# Patient Record
Sex: MALE | Race: WHITE | NOT HISPANIC OR LATINO | Employment: OTHER | URBAN - METROPOLITAN AREA
[De-identification: names, ages, dates, MRNs, and addresses within clinical notes are randomized per-mention and may not be internally consistent; named-entity substitution may affect disease eponyms.]

---

## 2017-04-28 ENCOUNTER — OFFICE VISIT (OUTPATIENT)
Dept: INTERNAL MEDICINE | Facility: CLINIC | Age: 62
End: 2017-04-28
Payer: COMMERCIAL

## 2017-04-28 VITALS
OXYGEN SATURATION: 98 % | BODY MASS INDEX: 28.28 KG/M2 | WEIGHT: 202 LBS | DIASTOLIC BLOOD PRESSURE: 74 MMHG | RESPIRATION RATE: 16 BRPM | TEMPERATURE: 97.7 F | HEIGHT: 71 IN | SYSTOLIC BLOOD PRESSURE: 126 MMHG | HEART RATE: 94 BPM

## 2017-04-28 DIAGNOSIS — Z00.00 ENCOUNTER FOR ROUTINE ADULT HEALTH EXAMINATION WITHOUT ABNORMAL FINDINGS: Primary | ICD-10-CM

## 2017-04-28 DIAGNOSIS — E78.5 HYPERLIPIDEMIA LDL GOAL <130: ICD-10-CM

## 2017-04-28 DIAGNOSIS — N52.9 ERECTILE DYSFUNCTION, UNSPECIFIED ERECTILE DYSFUNCTION TYPE: ICD-10-CM

## 2017-04-28 DIAGNOSIS — C61 MALIGNANT NEOPLASM OF PROSTATE (H): ICD-10-CM

## 2017-04-28 PROCEDURE — 99396 PREV VISIT EST AGE 40-64: CPT | Performed by: INTERNAL MEDICINE

## 2017-04-28 RX ORDER — ATORVASTATIN CALCIUM 20 MG/1
20 TABLET, FILM COATED ORAL DAILY
Qty: 90 TABLET | Refills: 3 | Status: SHIPPED | OUTPATIENT
Start: 2017-04-28 | End: 2018-03-21

## 2017-04-28 RX ORDER — TADALAFIL 20 MG/1
10-20 TABLET ORAL DAILY PRN
Qty: 6 TABLET | Refills: 3 | Status: SHIPPED | OUTPATIENT
Start: 2017-04-28 | End: 2019-09-17

## 2017-04-28 ASSESSMENT — PAIN SCALES - GENERAL: PAINLEVEL: NO PAIN (0)

## 2017-04-28 NOTE — PROGRESS NOTES
SUBJECTIVE:     CC: Jaswinder Thompson Sr. is an 62 year old male who presents for preventative health visit.     Healthy Habits:    Do you get at least three servings of calcium containing foods daily (dairy, green leafy vegetables, etc.)? yes    Amount of exercise or daily activities, outside of work: 3 days per week    Problems taking medications regularly No    Medication side effects: No    Have you had an eye exam in the past two years? yes    Do you see a dentist twice per year? yes    Do you have sleep apnea, excessive snoring or daytime drowsiness?no    Doing pretty well, no real complaints, needs the yearly check and refills.    Taking medications atorvastatin.   History of prostate cancer and prostatectomy.  Has some ED but on cialis.         Today's PHQ-2 Score:   PHQ-2 ( 1999 Pfizer) 4/28/2017 4/1/2016   Q1: Little interest or pleasure in doing things 0 0   Q2: Feeling down, depressed or hopeless 0 0   PHQ-2 Score 0 0       Abuse: Current or Past(Physical, Sexual or Emotional)- No  Do you feel safe in your environment - Yes    Social History   Substance Use Topics     Smoking status: Former Smoker     Quit date: 7/1/2005     Smokeless tobacco: Never Used     Alcohol use 0.0 oz/week     0 Standard drinks or equivalent per week      Comment: < one twelve pack beer per week     The patient does not drink >3 drinks per day nor >7 drinks per week.    Last PSA:   PSA   Date Value Ref Range Status   09/19/2016  0 - 4 ug/L Final    <0.01  Assay Method:  Chemiluminescence using Siemens Vista analyzer         Recent Labs   Lab Test  04/01/16   1523  03/30/15   1526  04/05/14   0824   CHOL  189  187  171   HDL  57  54  50   LDL  106*  92  95   TRIG  130  206*  126   CHOLHDLRATIO   --   3.5  3.0   NHDL  132*   --    --        Reviewed orders with patient. Reviewed health maintenance and updated orders accordingly - Yes    Reviewed and updated as needed this visit by clinical staff  Tobacco  Allergies  Meds      "    Reviewed and updated as needed this visit by Provider            ROS:  C: NEGATIVE for fever, chills, change in weight  I: NEGATIVE for worrisome rashes, moles or lesions  E: NEGATIVE for vision changes or irritation  ENT: NEGATIVE for ear, mouth and throat problems  R: NEGATIVE for significant cough or SOB  CV: NEGATIVE for chest pain, palpitations or peripheral edema  GI: NEGATIVE for nausea, abdominal pain, heartburn, or change in bowel habits   male: erectile dysfunction  M: NEGATIVE for significant arthralgias or myalgia  N: NEGATIVE for weakness, dizziness or paresthesias  P: NEGATIVE for changes in mood or affect    Problem list, Medication list, Allergies, and Medical/Social/Surgical histories reviewed in EPIC and updated as appropriate.  OBJECTIVE:     /74 (BP Location: Left arm, Patient Position: Chair, Cuff Size: Adult Large)  Pulse 94  Temp 97.7  F (36.5  C) (Temporal)  Resp 16  Ht 5' 11\" (1.803 m)  Wt 202 lb (91.6 kg)  SpO2 98%  BMI 28.17 kg/m2  EXAM:  GENERAL: healthy, alert and no distress  EYES: Eyes grossly normal to inspection, PERRL and conjunctivae and sclerae normal  HENT: ear canals and TM's normal, nose and mouth without ulcers or lesions  NECK: no adenopathy, no asymmetry, masses, or scars and thyroid normal to palpation  RESP: lungs clear to auscultation - no rales, rhonchi or wheezes  CV: regular rate and rhythm, normal S1 S2, no S3 or S4, no murmur, click or rub, no peripheral edema and peripheral pulses strong  ABDOMEN: soft, nontender, no hepatosplenomegaly, no masses and bowel sounds normal  MS: no gross musculoskeletal defects noted, no edema  SKIN: no suspicious lesions or rashes  NEURO: Normal strength and tone, mentation intact and speech normal  PSYCH: mentation appears normal, affect normal/bright    ASSESSMENT/PLAN:         ICD-10-CM    1. Encounter for routine adult health examination without abnormal findings Z00.00    2. Hyperlipidemia LDL goal <130 E78.5 " "Lipid Profile     Comprehensive metabolic panel     atorvastatin (LIPITOR) 20 MG tablet   3. Malignant neoplasm of prostate (H) C61 PSA, tumor marker   4. Erectile dysfunction, unspecified erectile dysfunction type N52.9 tadalafil (CIALIS) 20 MG tablet     Overall doing well, will check fasting lipids, liver, kidney, glucose and his psa tomorrow. Refills are done.      COUNSELING:  Reviewed preventive health counseling, as reflected in patient instructions       Regular exercise       Healthy diet/nutrition         reports that he quit smoking about 11 years ago. He has never used smokeless tobacco.    Estimated body mass index is 28.17 kg/(m^2) as calculated from the following:    Height as of this encounter: 5' 11\" (1.803 m).    Weight as of this encounter: 202 lb (91.6 kg).       Counseling Resources:  ATP IV Guidelines  Pooled Cohorts Equation Calculator  FRAX Risk Assessment  ICSI Preventive Guidelines  Dietary Guidelines for Americans, 2010  USDA's MyPlate  ASA Prophylaxis  Lung CA Screening    Javon oDmínguez MD  [facility]  "

## 2017-04-28 NOTE — NURSING NOTE
"Chief Complaint   Patient presents with     Physical       Initial /74 (BP Location: Left arm, Patient Position: Chair, Cuff Size: Adult Large)  Pulse 94  Temp 97.7  F (36.5  C) (Temporal)  Resp 16  Ht 5' 11\" (1.803 m)  Wt 202 lb (91.6 kg)  SpO2 98%  BMI 28.17 kg/m2 Estimated body mass index is 28.17 kg/(m^2) as calculated from the following:    Height as of this encounter: 5' 11\" (1.803 m).    Weight as of this encounter: 202 lb (91.6 kg).  Medication Reconciliation: complete    "

## 2017-04-28 NOTE — MR AVS SNAPSHOT
After Visit Summary   4/28/2017    Jaswinder Thompson Sr.    MRN: [id number]           Patient Information     Date Of Birth          1955        Visit Information        Provider Department      4/28/2017 4:00 PM Javon Domínguez MD [facility] Instructions      Preventive Health Recommendations  Male Ages 50 - 64    Yearly exam:             See your health care provider every year in order to  o   Review health changes.   o   Discuss preventive care.    o   Review your medicines if your doctor has prescribed any.     Have a cholesterol test every 5 years, or more frequently if you are at risk for high cholesterol/heart disease.     Have a diabetes test (fasting glucose) every three years. If you are at risk for diabetes, you should have this test more often.     Have a colonoscopy at age 50, or have a yearly FIT test (stool test). These exams will check for colon cancer.      Talk with your health care provider about whether or not a prostate cancer screening test (PSA) is right for you.    You should be tested each year for STDs (sexually transmitted diseases), if you re at risk.     Shots: Get a flu shot each year. Get a tetanus shot every 10 years.     Nutrition:    Eat at least 5 servings of fruits and vegetables daily.     Eat whole-grain bread, whole-wheat pasta and brown rice instead of white grains and rice.     Talk to your provider about Calcium and Vitamin D.     Lifestyle    Exercise for at least 150 minutes a week (30 minutes a day, 5 days a week). This will help you control your weight and prevent disease.     Limit alcohol to one drink per day.     No smoking.     Wear sunscreen to prevent skin cancer.     See your dentist every six months for an exam and cleaning.     See your eye doctor every 1 to 2 years.          Follow-ups after your visit        Who to contact     If you have questions or need follow up information about today's clinic visit or your  "schedule please contact [facility] directly at [phone].  Normal or non-critical lab and imaging results will be communicated to you by MyChart, letter or phone within 4 business days after the clinic has received the results. If you do not hear from us within 7 days, please contact the clinic through [employer] or phone. If you have a critical or abnormal lab result, we will notify you by phone as soon as possible.  Submit refill requests through [employer] or call your pharmacy and they will forward the refill request to us. Please allow 3 business days for your refill to be completed.          Additional Information About Your Visit        [employer] Information     [employer] gives you secure access to your electronic health record. If you see a primary care provider, you can also send messages to your care team and make appointments. If you have questions, please call your primary care clinic.  If you do not have a primary care provider, please call [phone] and they will assist you.        Care EveryWhere ID     This is your Care EveryWhere ID. This could be used by other organizations to access your [facility] medical records  [id number]        Your Vitals Were     Pulse Temperature Respirations Height Pulse Oximetry BMI (Body Mass Index)    94 97.7  F (36.5  C) (Temporal) 16 5' 11\" (1.803 m) 98% 28.17 kg/m2       Blood Pressure from Last 3 Encounters:   04/28/17 126/74   05/02/16 128/77   04/29/16 124/72    Weight from Last 3 Encounters:   04/28/17 202 lb (91.6 kg)   05/02/16 201 lb (91.2 kg)   04/29/16 201 lb (91.2 kg)              Today, you had the following     No orders found for display       Primary Care Provider    None Specified       No primary provider on file.        Thank you!     Thank you for choosing [facility]  for your care. Our goal is always to provide you with excellent care. Hearing back from our patients is one way we can continue to improve our services. " Please take a few minutes to complete the written survey that you may receive in the mail after your visit with us. Thank you!             Your Updated Medication List - Protect others around you: Learn how to safely use, store and throw away your medicines at www.disposemymeds.org.          This list is accurate as of: 4/28/17  4:06 PM.  Always use your most recent med list.                   Brand Name Dispense Instructions for use    aspirin 81 MG tablet     100    ONE DAILY       atorvastatin 20 MG tablet    LIPITOR    90 tablet    Take 1 tablet (20 mg) by mouth daily       CoQ10 100 MG Caps      2 cap daily       Fish Oil 500 MG Caps      1 capsule daily       Glucosamine HCl 1500 MG Tabs      1 tablet twice aday       Multiple vitamin Caps      1 capsule daily       tadalafil 20 MG tablet    CIALIS    6 tablet    Take 0.5-1 tablets (10-20 mg) by mouth daily as needed for erectile dysfunction Never use with nitroglycerin, terazosin or doxazosin.

## 2017-04-29 DIAGNOSIS — E78.5 HYPERLIPIDEMIA LDL GOAL <130: ICD-10-CM

## 2017-04-29 DIAGNOSIS — C61 MALIGNANT NEOPLASM OF PROSTATE (H): ICD-10-CM

## 2017-04-29 LAB
ALBUMIN SERPL-MCNC: 4.3 G/DL (ref 3.4–5)
ALP SERPL-CCNC: 70 U/L (ref 40–150)
ALT SERPL W P-5'-P-CCNC: 43 U/L (ref 0–70)
ANION GAP SERPL CALCULATED.3IONS-SCNC: 9 MMOL/L (ref 3–14)
AST SERPL W P-5'-P-CCNC: 19 U/L (ref 0–45)
BILIRUB SERPL-MCNC: 0.7 MG/DL (ref 0.2–1.3)
BUN SERPL-MCNC: 15 MG/DL (ref 7–30)
CALCIUM SERPL-MCNC: 11 MG/DL (ref 8.5–10.1)
CHLORIDE SERPL-SCNC: 103 MMOL/L (ref 94–109)
CHOLEST SERPL-MCNC: 196 MG/DL
CO2 SERPL-SCNC: 30 MMOL/L (ref 20–32)
CREAT SERPL-MCNC: 1.03 MG/DL (ref 0.66–1.25)
GFR SERPL CREATININE-BSD FRML MDRD: 73 ML/MIN/1.7M2
GLUCOSE SERPL-MCNC: 86 MG/DL (ref 70–99)
HDLC SERPL-MCNC: 56 MG/DL
LDLC SERPL CALC-MCNC: 108 MG/DL
NONHDLC SERPL-MCNC: 140 MG/DL
POTASSIUM SERPL-SCNC: 4.1 MMOL/L (ref 3.4–5.3)
PROT SERPL-MCNC: 7.2 G/DL (ref 6.8–8.8)
PSA SERPL-MCNC: NORMAL UG/L (ref 0–4)
SODIUM SERPL-SCNC: 142 MMOL/L (ref 133–144)
TRIGL SERPL-MCNC: 162 MG/DL

## 2017-04-29 PROCEDURE — 84153 ASSAY OF PSA TOTAL: CPT | Performed by: INTERNAL MEDICINE

## 2017-04-29 PROCEDURE — 36415 COLL VENOUS BLD VENIPUNCTURE: CPT | Performed by: INTERNAL MEDICINE

## 2017-04-29 PROCEDURE — 80061 LIPID PANEL: CPT | Performed by: INTERNAL MEDICINE

## 2017-04-29 PROCEDURE — 80053 COMPREHEN METABOLIC PANEL: CPT | Performed by: INTERNAL MEDICINE

## 2017-05-01 ENCOUNTER — TELEPHONE (OUTPATIENT)
Dept: INTERNAL MEDICINE | Facility: CLINIC | Age: 62
End: 2017-05-01

## 2017-05-01 DIAGNOSIS — E83.52 SERUM CALCIUM ELEVATED: Primary | ICD-10-CM

## 2017-05-01 NOTE — TELEPHONE ENCOUNTER
----- Message from Javon Domínguez MD sent at 4/30/2017  7:02 PM CDT -----  His labs are good but calcium is high at 11, repeat a calcium in a week or so to double check that.

## 2017-05-06 DIAGNOSIS — E83.52 SERUM CALCIUM ELEVATED: ICD-10-CM

## 2017-05-06 LAB — CALCIUM SERPL-MCNC: 8.9 MG/DL (ref 8.5–10.1)

## 2017-05-06 PROCEDURE — 82310 ASSAY OF CALCIUM: CPT | Performed by: INTERNAL MEDICINE

## 2017-05-06 PROCEDURE — 36415 COLL VENOUS BLD VENIPUNCTURE: CPT | Performed by: INTERNAL MEDICINE

## 2017-06-04 DIAGNOSIS — E78.5 HYPERLIPIDEMIA LDL GOAL <130: ICD-10-CM

## 2017-06-07 RX ORDER — ATORVASTATIN CALCIUM 20 MG/1
TABLET, FILM COATED ORAL
Qty: 90 TABLET | Refills: 0 | OUTPATIENT
Start: 2017-06-07

## 2018-02-12 ENCOUNTER — TELEPHONE (OUTPATIENT)
Dept: INTERNAL MEDICINE | Facility: CLINIC | Age: 63
End: 2018-02-12

## 2018-02-12 ENCOUNTER — OFFICE VISIT (OUTPATIENT)
Dept: INTERNAL MEDICINE | Facility: CLINIC | Age: 63
End: 2018-02-12
Payer: COMMERCIAL

## 2018-02-12 VITALS
DIASTOLIC BLOOD PRESSURE: 78 MMHG | WEIGHT: 213 LBS | SYSTOLIC BLOOD PRESSURE: 136 MMHG | HEART RATE: 84 BPM | RESPIRATION RATE: 16 BRPM | BODY MASS INDEX: 28.85 KG/M2 | HEIGHT: 72 IN | TEMPERATURE: 97.2 F | OXYGEN SATURATION: 98 %

## 2018-02-12 DIAGNOSIS — E78.5 HYPERLIPIDEMIA LDL GOAL <130: ICD-10-CM

## 2018-02-12 DIAGNOSIS — C61 MALIGNANT NEOPLASM OF PROSTATE (H): ICD-10-CM

## 2018-02-12 DIAGNOSIS — R06.83 SNORING: ICD-10-CM

## 2018-02-12 DIAGNOSIS — Z87.891 PERSONAL HISTORY OF TOBACCO USE, PRESENTING HAZARDS TO HEALTH: ICD-10-CM

## 2018-02-12 DIAGNOSIS — Z00.00 ENCOUNTER FOR ROUTINE ADULT HEALTH EXAMINATION WITHOUT ABNORMAL FINDINGS: Primary | ICD-10-CM

## 2018-02-12 DIAGNOSIS — Z13.6 SCREENING FOR ABDOMINAL AORTIC ANEURYSM: ICD-10-CM

## 2018-02-12 LAB
ALBUMIN SERPL-MCNC: 4.1 G/DL (ref 3.4–5)
ALP SERPL-CCNC: 98 U/L (ref 40–150)
ALT SERPL W P-5'-P-CCNC: 113 U/L (ref 0–70)
ANION GAP SERPL CALCULATED.3IONS-SCNC: 9 MMOL/L (ref 3–14)
AST SERPL W P-5'-P-CCNC: 49 U/L (ref 0–45)
BILIRUB SERPL-MCNC: 0.5 MG/DL (ref 0.2–1.3)
BUN SERPL-MCNC: 17 MG/DL (ref 7–30)
CALCIUM SERPL-MCNC: 8.7 MG/DL (ref 8.5–10.1)
CHLORIDE SERPL-SCNC: 105 MMOL/L (ref 94–109)
CHOLEST SERPL-MCNC: 198 MG/DL
CO2 SERPL-SCNC: 28 MMOL/L (ref 20–32)
CREAT SERPL-MCNC: 1 MG/DL (ref 0.66–1.25)
GFR SERPL CREATININE-BSD FRML MDRD: 75 ML/MIN/1.7M2
GLUCOSE SERPL-MCNC: 92 MG/DL (ref 70–99)
HDLC SERPL-MCNC: 54 MG/DL
LDLC SERPL CALC-MCNC: 79 MG/DL
NONHDLC SERPL-MCNC: 144 MG/DL
POTASSIUM SERPL-SCNC: 4.4 MMOL/L (ref 3.4–5.3)
PROT SERPL-MCNC: 7.1 G/DL (ref 6.8–8.8)
PSA SERPL-MCNC: <0.01 UG/L (ref 0–4)
SODIUM SERPL-SCNC: 142 MMOL/L (ref 133–144)
TRIGL SERPL-MCNC: 324 MG/DL

## 2018-02-12 PROCEDURE — 93000 ELECTROCARDIOGRAM COMPLETE: CPT | Performed by: INTERNAL MEDICINE

## 2018-02-12 PROCEDURE — 36415 COLL VENOUS BLD VENIPUNCTURE: CPT | Performed by: INTERNAL MEDICINE

## 2018-02-12 PROCEDURE — G0009 ADMIN PNEUMOCOCCAL VACCINE: HCPCS | Performed by: INTERNAL MEDICINE

## 2018-02-12 PROCEDURE — 84153 ASSAY OF PSA TOTAL: CPT | Performed by: INTERNAL MEDICINE

## 2018-02-12 PROCEDURE — 80053 COMPREHEN METABOLIC PANEL: CPT | Performed by: INTERNAL MEDICINE

## 2018-02-12 PROCEDURE — 80061 LIPID PANEL: CPT | Performed by: INTERNAL MEDICINE

## 2018-02-12 PROCEDURE — 90670 PCV13 VACCINE IM: CPT | Performed by: INTERNAL MEDICINE

## 2018-02-12 PROCEDURE — G0402 INITIAL PREVENTIVE EXAM: HCPCS | Performed by: INTERNAL MEDICINE

## 2018-02-12 ASSESSMENT — PAIN SCALES - GENERAL: PAINLEVEL: NO PAIN (0)

## 2018-02-12 ASSESSMENT — ACTIVITIES OF DAILY LIVING (ADL)
CURRENT_FUNCTION: NO ASSISTANCE NEEDED
I_NEED_ASSISTANCE_FOR_THE_FOLLOWING_DAILY_ACTIVITIES:: NO ASSISTANCE IS NEEDED

## 2018-02-12 NOTE — PROGRESS NOTES
SUBJECTIVE:   Jaswinder Thompson Sr. is a 63 year old male who presents for Preventive Visit.    Feeling good, was in [place].  Sleep issues for years, snores but breath rite helps him quite a bit. Falls asleep easily but wakes up after 2-3 hours.  Then lays there for a few hours.  Fine with sleeping pill.  Will be joining a gym    Are you in the first 12 months of your Medicare coverage?  Yes,  Visual Acuity:  Right Eye: 20/125   Left Eye: 20/50  Both Eyes: 20/40    Physical   Annual:     Getting at least 3 servings of Calcium per day::  Yes    Bi-annual eye exam::  Yes    Dental care twice a year::  NO    Sleep apnea or symptoms of sleep apnea::  Daytime drowsiness and Sleep apnea    Diet::  Regular (no restrictions)    Taking medications regularly::  Yes    Medication side effects::  None    Additional concerns today::  No    Ability to successfully perform activities of daily living: no assistance needed  Home Safety:  Throw rugs in the hallway  Hearing Impairment: difficulty following a conversation in a noisy restaurant or crowded room, feel that people are mumbling or not speaking clearly, need to ask people to speak up or repeat themselves and difficulty understanding soft or whispered speech  tested at work.  Has some loss    Ability to successfully perform activities of daily living: Yes, no assistance needed  Home safety:  none identified   Hearing impairment: Yes, a little    Fall risk:  Fallen 2 or more times in the past year?: No  Any fall with injury in the past year?: No    COGNITIVE SCREEN  1) Repeat 3 items (Banana, Sunrise, Chair)    2) Clock draw: NORMAL  3) 3 item recall: Recalls 2 objects   Results: NORMAL clock, 1-2 items recalled: COGNITIVE IMPAIRMENT LESS LIKELY    Mini-CogTM Copyright S Nyla. Licensed by the author for use in [facility]; reprinted with permission (amber@.[facility]). All rights reserved.        Reviewed and updated as needed this visit by clinical staff  Allergies   Meds         Reviewed and updated as needed this visit by Provider        Social History   Substance Use Topics     Smoking status: Former Smoker     Quit date: 7/1/2005     Smokeless tobacco: Never Used     Alcohol use 0.0 oz/week     0 Standard drinks or equivalent per week      Comment: < one twelve pack beer per week       Alcohol Use 2/12/2018   If you drink alcohol, do you typically have greater than 3 drinks per day OR greater than 7 drinks per week?   No         Today's PHQ-2 Score:   PHQ-2 ( 1999 Pfizer) 2/12/2018   Q1: Little interest or pleasure in doing things 0   Q2: Feeling down, depressed or hopeless 0   PHQ-2 Score 0   Q1: Little interest or pleasure in doing things Not at all   Q2: Feeling down, depressed or hopeless Not at all   PHQ-2 Score 0       Do you feel safe in your environment - Yes    Do you have a Health Care Directive?: No: Advance care planning was reviewed with patient; patient declined at this time.    Current providers sharing in care for this patient include:   Patient Care Team:  Javon Domínguez MD as PCP - General (Internal Medicine)    The following health maintenance items are reviewed in Epic and correct as of today:  Health Maintenance   Topic Date Due     HEPATITIS C SCREENING  02/01/1971     ADVANCE DIRECTIVE PLANNING Q5 YRS  03/21/2017     INFLUENZA VACCINE (SYSTEM ASSIGNED)  09/01/2017     PNEUMOCOCCAL (1 of 2 - PCV13) 02/01/2018     AORTIC ANEURYSM SCREENING (SYSTEM ASSIGNED)  02/01/2018     FALL RISK ASSESSMENT  02/01/2018     LIPID MONITORING Q1 YEAR  04/29/2018     TETANUS IMMUNIZATION (SYSTEM ASSIGNED)  03/06/2019     COLONOSCOPY Q5 YR  05/02/2021       Pneumonia Vaccine:Adults age 65+ who have not received previous Pneumovax (PPSV23) or PCV13 as an adult: Should first be given PCV13 AND then should be given PPSV23 6-12 months after PCV13    Review of Systems  C: NEGATIVE for fever, chills, change in weight  I: NEGATIVE for worrisome rashes, moles or lesions  E:  "NEGATIVE for vision changes or irritation  E/M: NEGATIVE for ear, mouth and throat problems  R: NEGATIVE for significant cough or SOB  B: NEGATIVE for masses, tenderness or discharge  CV: NEGATIVE for chest pain, palpitations or peripheral edema  GI: NEGATIVE for nausea, abdominal pain, heartburn, or change in bowel habits  : NEGATIVE for frequency, dysuria, or hematuria  M: NEGATIVE for significant arthralgias or myalgia  N: NEGATIVE for weakness, dizziness or paresthesias  E: NEGATIVE for temperature intolerance, skin/hair changes  H: NEGATIVE for bleeding problems  P: NEGATIVE for changes in mood or affect    OBJECTIVE:   /78  Pulse 84  Temp 97.2  F (36.2  C) (Temporal)  Resp 16  Ht 5' 11.75\" (1.822 m)  Wt 213 lb (96.6 kg)  SpO2 98%  BMI 29.09 kg/m2 Estimated body mass index is 28.17 kg/(m^2) as calculated from the following:    Height as of 4/28/17: 5' 11\" (1.803 m).    Weight as of 4/28/17: 202 lb (91.6 kg).  Physical Exam  GENERAL: healthy, alert and no distress  EYES: Eyes grossly normal to inspection, PERRL and conjunctivae and sclerae normal  HENT: ear canals and TM's normal, nose and mouth without ulcers or lesions  NECK: no adenopathy, no asymmetry, masses, or scars and thyroid normal to palpation  NECK: chronic right side lipoma  RESP: lungs clear to auscultation - no rales, rhonchi or wheezes  CV: regular rate and rhythm, normal S1 S2, no S3 or S4, no murmur, click or rub, no peripheral edema and peripheral pulses strong  ABDOMEN: soft, nontender, no hepatosplenomegaly, no masses and bowel sounds normal  MS: no gross musculoskeletal defects noted, no edema  SKIN: no suspicious lesions or rashes  NEURO: Normal strength and tone, mentation intact and speech normal  PSYCH: mentation appears normal, affect normal/bright    EKG-NSR no st changes.      ASSESSMENT / PLAN:       ICD-10-CM    1. Encounter for routine adult health examination without abnormal findings Z00.00 Lipid Profile     " "Comprehensive metabolic panel     EKG 12-lead complete w/read - Clinics     Pneumococcal vaccine 13 valent PCV13 IM (Prevnar) [44939]     ADMIN: Vaccine, Initial (89249)   2. Hyperlipidemia LDL goal <130 E78.5 Lipid Profile     Comprehensive metabolic panel   3. Malignant neoplasm of prostate (H) C61 PSA, tumor marker   4. Screening for abdominal aortic aneurysm Z13.6  Aorta Medicare AAA Screening     CANCELED:  Abdomen Limited     CANCELED:  Aorta Medicare AAA Screening   5. Personal history of tobacco use, presenting hazards to health Z87.891  Aorta Medicare AAA Screening     CANCELED:  Abdomen Limited     CANCELED: [place] Aorta Medicare AAA Screening   6. Snoring R06.83 SLEEP EVALUATION & MANAGEMENT REFERRAL - [facility] [phone] (Age 11 if over 100 lbs)     Needs psa with past prostate cancer  Lipids are treated, check labs.  For history of smoking will get a AAA screen  EKG for medicare wellness first exam    For his sleep issues and insomnia, daytime sleepiness and snoring will refer to sleep, may have sleep apnea and needs a sleep study.    End of Life Planning:  Patient currently has an advanced directive: No.  I have verified the patient's ablity to prepare an advanced directive/make health care decisions.  Literature was provided to assist patient in preparing an advanced directive.    COUNSELING:  Reviewed preventive health counseling, as reflected in patient instructions       Consider AAA screening for ages 65-75 and smoking history       Regular exercise       Healthy diet/nutrition       Immunizations    Vaccinated for: Pneumococcal              Estimated body mass index is 28.17 kg/(m^2) as calculated from the following:    Height as of 4/28/17: 5' 11\" (1.803 m).    Weight as of 4/28/17: 202 lb (91.6 kg).     reports that he quit smoking about 12 years ago. He has never used smokeless tobacco.      Appropriate preventive services were discussed with this " patient, including applicable screening as appropriate for cardiovascular disease, diabetes, osteopenia/osteoporosis, and glaucoma.  As appropriate for age/gender, discussed screening for colorectal cancer, prostate cancer, breast cancer, and cervical cancer. Checklist reviewing preventive services available has been given to the patient.    Reviewed patients plan of care and provided an AVS. The Basic Care Plan (routine screening as documented in Health Maintenance) for Jaswinder meets the Care Plan requirement. This Care Plan has been established and reviewed with the Patient.    Counseling Resources:  ATP IV Guidelines  Pooled Cohorts Equation Calculator  Breast Cancer Risk Calculator  FRAX Risk Assessment  ICSI Preventive Guidelines  Dietary Guidelines for Americans, 2010  USDA's MyPlate  ASA Prophylaxis  Lung CA Screening    Javon Domínguez MD  [facility]

## 2018-02-12 NOTE — TELEPHONE ENCOUNTER
----- Message from Javon Domínguez MD sent at 2/12/2018  1:15 PM CST -----  His psa is ok, none seen. His triglycerides and liver tests are up a little, can be from fatty high carb foods.  Watch his diet and then recheck fasting labs for lipids and lfts in 2 months

## 2018-02-12 NOTE — PROGRESS NOTES
Screening Questionnaire for Adult Immunization    Are you sick today?   No   Do you have allergies to medications, food, a vaccine component or latex?   No   Have you ever had a serious reaction after receiving a vaccination?   No   Do you have a long-term health problem with heart disease, lung disease, asthma, kidney disease, metabolic disease (e.g. diabetes), anemia, or other blood disorder?   No   Do you have cancer, leukemia, HIV/AIDS, or any other immune system problem?   No   In the past 3 months, have you taken medications that affect  your immune system, such as prednisone, other steroids, or anticancer drugs; drugs for the treatment of rheumatoid arthritis, Crohn s disease, or psoriasis; or have you had radiation treatments?   No   Have you had a seizure, or a brain or other nervous system problem?   No   During the past year, have you received a transfusion of blood or blood     products, or been given immune (gamma) globulin or antiviral drug?   No   For women: Are you pregnant or is there a chance you could become        pregnant during the next month?   No   Have you received any vaccinations in the past 4 weeks?   No     Immunization questionnaire answers were all negative.        Per orders of Dr. Javon Domínguez, injection of Prevnar given by Shyann Ness. Patient instructed to remain in clinic for 15 minutes afterwards, and to report any adverse reaction to me immediately.       Screening performed by Shyann Ness on 2/12/2018 at 9:36 AM.

## 2018-02-12 NOTE — TELEPHONE ENCOUNTER
Patient called back and info was given.  Thank you,  Tamra Ding   for [facility]

## 2018-02-12 NOTE — NURSING NOTE
"Chief Complaint   Patient presents with     Medicare Visit     welcome to medicare       Initial /78  Pulse 84  Temp 97.2  F (36.2  C) (Temporal)  Resp 16  Ht 5' 11.75\" (1.822 m)  Wt 213 lb (96.6 kg)  SpO2 98%  BMI 29.09 kg/m2 Estimated body mass index is 29.09 kg/(m^2) as calculated from the following:    Height as of this encounter: 5' 11.75\" (1.822 m).    Weight as of this encounter: 213 lb (96.6 kg).  Medication Reconciliation: complete    "

## 2018-02-12 NOTE — MR AVS SNAPSHOT
After Visit Summary   2/12/2018    Jaswinder Thompson Sr.    MRN: [id number]           Patient Information     Date Of Birth          1955        Visit Information        Provider Department      2/12/2018 8:45 AM Javon Domínguez MD [facility]        Today's Diagnoses     Encounter for routine adult health examination without abnormal findings    -  1    Hyperlipidemia LDL goal <130        Malignant neoplasm of prostate (H)        Screening for abdominal aortic aneurysm        Personal history of tobacco use, presenting hazards to health        Snoring           Follow-ups after your visit        Additional Services     SLEEP EVALUATION & MANAGEMENT REFERRAL - ADULT -[facility] [place] [phone] (Age 13 if over 100 lbs)       Please be aware that coverage of these services is subject to the terms and limitations of your health insurance plan.  Call member services at your health plan with any benefit or coverage questions.      Please bring the following to your appointment:    >>   List of current medications   >>   This referral request   >>   Any documents/labs given to you for this referral                      Your next 10 appointments already scheduled     Feb 16, 2018  9:30 AM CST   [facility] [place] MEDICARE AAA SCREENING with PHUS1   [facility]trasound ([facility])    [address] [place] [id number] [phone]           Please bring a list of your medicines (including vitamins, minerals and over-the-counter drugs). Also, tell your doctor about any allergies you may have. Wear comfortable clothes and leave your valuables at home.  Adults: No eating or drinking for 8 hours before the exam. You may take medicine with a small sip of water.  Children: - Children 6+ years: No food or drink for 6 hours before exam. - Children 1-5 years: No food or drink for 4 hours before exam. - Infants, breast-fed: may have breast milk up to 2  "hours before exam. - Infants, formula: may have bottle until 4 hours before exam.  Please call the Imaging Department at your exam site with any questions.              Future tests that were ordered for you today     Open Future Orders        Priority Expected Expires Ordered    SLEEP EVALUATION & MANAGEMENT REFERRAL - ADULT -[facility] [place] [phone] (Age 13 if over 100 lbs) Routine  2/12/2019 2/12/2018    [place] AAA Screening Routine  2/12/2019 2/12/2018            Who to contact     If you have questions or need follow up information about today's clinic visit or your schedule please contact [facility] directly at [phone].  Normal or non-critical lab and imaging results will be communicated to you by MyChart, letter or phone within 4 business days after the clinic has received the results. If you do not hear from us within 7 days, please contact the clinic through [employer] or phone. If you have a critical or abnormal lab result, we will notify you by phone as soon as possible.  Submit refill requests through [employer] or call your pharmacy and they will forward the refill request to us. Please allow 3 business days for your refill to be completed.          Additional Information About Your Visit        [employer] Information     [employer] gives you secure access to your electronic health record. If you see a primary care provider, you can also send messages to your care team and make appointments. If you have questions, please call your primary care clinic.  If you do not have a primary care provider, please call [phone] and they will assist you.        Care EveryWhere ID     This is your Care EveryWhere ID. This could be used by other organizations to access your [place] medical records  [id number]        Your Vitals Were     Pulse Temperature Respirations Height Pulse Oximetry BMI (Body Mass Index)    84 97.2  F (36.2  C) (Temporal) 16 5' 11.75\" (1.822 m) " 98% 29.09 kg/m2       Blood Pressure from Last 3 Encounters:   02/12/18 136/78   04/28/17 126/74   05/02/16 128/77    Weight from Last 3 Encounters:   02/12/18 213 lb (96.6 kg)   04/28/17 202 lb (91.6 kg)   05/02/16 201 lb (91.2 kg)              We Performed the Following     Comprehensive metabolic panel     EKG 12-lead complete w/read - Clinics     Lipid Profile     PSA, tumor marker        Primary Care Provider Office Phone # Fax #    Javon Domínguez [phone]       [facility] [address] [place] [address]        Equal Access to Services     SIVA KEITH : Hadii yuliya tom Soleanne, waaxda luqadaha, qaybta kaalmada aderivka, kierra pickens. So [facility] [phone].    ATENCIÓN: Si habla español, tiene a francisco disposición servicios gratuitos de asistencia lingüística. Llame al [phone].    We comply with applicable federal civil rights laws and [place] laws. We do not discriminate on the basis of race, color, national origin, age, disability, sex, sexual orientation, or gender identity.            Thank you!     Thank you for choosing [facility]  for your care. Our goal is always to provide you with excellent care. Hearing back from our patients is one way we can continue to improve our services. Please take a few minutes to complete the written survey that you may receive in the mail after your visit with us. Thank you!             Your Updated Medication List - Protect others around you: Learn how to safely use, store and throw away your medicines at www.disposemymeds.org.          This list is accurate as of 2/12/18  9:37 AM.  Always use your most recent med list.                   Brand Name Dispense Instructions for use Diagnosis    aspirin 81 MG tablet     100    ONE DAILY    Other and unspecified hyperlipidemia       atorvastatin 20 MG tablet    LIPITOR    90 tablet    Take 1 tablet (20 mg) by mouth daily     Hyperlipidemia LDL goal <130       Fish Oil 500 MG Caps      1 capsule daily        SLEEP AID 25 MG Tabs tablet   Generic drug:  doxylamine      Take 25 mg by mouth nightly as needed        tadalafil 20 MG tablet    CIALIS    6 tablet    Take 0.5-1 tablets (10-20 mg) by mouth daily as needed for erectile dysfunction Never use with nitroglycerin, terazosin or doxazosin.    Erectile dysfunction, unspecified erectile dysfunction type

## 2018-02-16 ENCOUNTER — HOSPITAL ENCOUNTER (OUTPATIENT)
Dept: ULTRASOUND IMAGING | Facility: CLINIC | Age: 63
Discharge: HOME | End: 2018-02-16
Attending: INTERNAL MEDICINE | Admitting: INTERNAL MEDICINE
Payer: MEDICARE

## 2018-02-16 DIAGNOSIS — Z13.6 SCREENING FOR ABDOMINAL AORTIC ANEURYSM: ICD-10-CM

## 2018-02-16 DIAGNOSIS — Z87.891 PERSONAL HISTORY OF TOBACCO USE, PRESENTING HAZARDS TO HEALTH: ICD-10-CM

## 2018-02-16 PROCEDURE — 76706 US ABDL AORTA SCREEN AAA: CPT | Mod: TC

## 2018-02-21 ENCOUNTER — OFFICE VISIT (OUTPATIENT)
Dept: SLEEP MEDICINE | Facility: CLINIC | Age: 63
End: 2018-02-21
Attending: INTERNAL MEDICINE
Payer: COMMERCIAL

## 2018-02-21 VITALS
DIASTOLIC BLOOD PRESSURE: 71 MMHG | OXYGEN SATURATION: 95 % | SYSTOLIC BLOOD PRESSURE: 129 MMHG | HEART RATE: 91 BPM | BODY MASS INDEX: 28.5 KG/M2 | RESPIRATION RATE: 18 BRPM | WEIGHT: 210.4 LBS | HEIGHT: 72 IN

## 2018-02-21 DIAGNOSIS — F51.04 PSYCHOPHYSIOLOGICAL INSOMNIA: Primary | ICD-10-CM

## 2018-02-21 DIAGNOSIS — R06.83 SNORING: ICD-10-CM

## 2018-02-21 PROCEDURE — 99203 OFFICE O/P NEW LOW 30 MIN: CPT | Performed by: PHYSICIAN ASSISTANT

## 2018-02-21 NOTE — MR AVS SNAPSHOT
"              After Visit Summary   2/21/2018    Jaswinder Thompson .    MRN: [id number]           Patient Information     Date Of Birth          1955        Visit Information        Provider Department      2/21/2018 9:00 AM Stephon Fleming PA-C [facility] [place]        Today's Diagnoses     Snoring          Care Instructions    MY TREATMENT INFORMATION FOR SLEEP APNEA-  Jaswinder Thompson     DOCTOR : Stephon Fleming  SLEEP CENTER :      MY CONTACT NUMBER:     Am I having a sleep study at a sleep center?  Make sure you have an appointment for the study before you leave!    Am I having a home sleep study?  Watch this video:  https://www.Buy.On.Social.com/watch?v=CteI_GhyP9g&list=PLC4F_nvCEvSxpvRkgPszaicmjcb2PMExm    Frequently asked questions:  1. What is Obstructive Sleep Apnea (ARIANNA)? ARIANNA is the most common type of sleep apnea. Apnea means, \"without breath.\"  Apnea is most often caused by narrowing or collapse of the upper airway as muscles relax during sleep.   Almost everyone has occasional apneas. Most people with sleep apnea have had brief interruptions at night frequently for many years.  The severity of sleep apnea is related to how frequent and severe the events are.   2. What are the consequences of ARIANNA? Symptoms include: feeling sleepy during the day, snoring loudly, gasping or stopping of breathing, trouble sleeping, and occasionally morning headaches or heartburn at night.  Sleepiness can be serious and even increase the risk of falling asleep while driving. Other health consequences may include development of high blood pressure and other cardiovascular disease in persons who are susceptible. Untreated ARIANNA  can contribute to heart disease, stroke and diabetes.   3. What are the treatment options? In most situations, sleep apnea is a lifelong disease that must be managed with daily therapy. Medications are not effective for sleep apnea and surgery is generally not considered until " other therapies have been tried. Your treatment is your choice . Continuous Positive Airway (CPAP) works right away and is the therapy that is effective in nearly everyone. An oral device to hold your jaw forward is usually the next most reliable option. Other options include postioning devices (to keep you off your back), weight loss, and surgery including a tongue pacing device. There is more detail about some of these options below.    Important tips for using CPAP and similar devices   Know your equipment:  CPAP is continuous positive airway pressure that prevents obstructive sleep apnea by keeping the throat from collapsing while you are sleeping. In most cases, the device is  smart  and can slowly self-adjusts if your throat collapses and keeps a record every day of how well you are treated-this information is available to you and your care team.  BPAP is bilevel positive airway pressure that keeps your throat open and also assists each breath with a pressure boost to maintain adequate breathing.  Special kinds of BPAP are used in patients who have inadequate breathing from lung or heart disease. In most cases, the device is  smart  and can slowly self-adjusts to assist breathing. Like CPAP, the device keeps a record of how well you are treated.  Your mask is your connection to the device. You get to choose what feels most comfortable and the staff will help to make sure if fits. Here: are some examples of the different masks that are available:       Key points to remember on your journey with sleep apnea:  1. Sleep study.  PAP devices often need to be adjusted during a sleep study to show that they are effective and adjusted right.  2. Good tips to remember: Try wearing just the mask during a quiet time during the day so your body adapts to wearing it. A humidifier is recommended for comfort in most cases to prevent drying of your nose and throat. Allergy medication from your provider may help you if you are  having nasal congestion.  3. Getting settled-in. It takes more than one night for most of us to get used to wearing a mask. Try wearing just the mask during a quiet time during the day so your body adapts to wearing it. A humidifier is recommended for comfort in most cases. Our team will work with you carefully on the first day and will be in contact within 4 days and again at 2 and 4 weeks for advice and remote device adjustments. Your therapy is evaluated by the device each day.   4. Use it every night. The more you are able to sleep naturally for 7-8 hours, the more likely you will have good sleep and to prevent health risks or symptoms from sleep apnea. Even if you use it 4 hours it helps. Occasionally all of us are unable to use a medical therapy, in sleep apnea, it is not dangerous to miss one night.   5. Communicate. Call our skilled team on the number provided on the first day if your visit for problems that make it difficult to wear the device. Over 2 out of 3 patients can learn to wear the device long-term with help from our team. Remember to call our team or your sleep providers if you are unable to wear the device as we may have other solutions for those who cannot adapt to mask CPAP therapy. It is recommended that you sleep your sleep provider within the first 3 months and yearly after that if you are not having problems.   Take care of your equipment. Make sure you clean your mask and tubing using directions every day and that your filter and mask are replaced as recommended or if they are not working.     BESIDES CPAP, WHAT OTHER THERAPIES ARE THERE?    Positioning Device  Positioning devices are generally used when sleep apnea is mild and only occurs on your back.This example shows a pillow that straps around the waist. It may be appropriate for those whose sleep study shows milder sleep apnea that occurs primarily when lying flat on one's back. Preliminary studies have shown benefit but  effectiveness at home may need to be verified by a home sleep test. These devices are generally not covered by medical insurance.  Examples of devices that maintain sleeping on the back to prevent snoring and mild sleep apnea.    Belt type body positioner  Http://[employer].com/    Electronic reminder  Http://nightshifttherapy.com/  Http://www.Vital Sensorsd.Appbyme.au/    Oral Appliance  What is oral appliance therapy?  An oral appliance device fits on your teeth at night like a retainer used after having braces. The device is made by a specialized dentist and requires several visits over 1-2 months before a manufactured device is made to fit your teeth and is adjusted to prevent your sleep apnea. Once an oral device is working properly, snoring should be improved. A home sleep test may be recommended at that time if to determine whether the sleep apnea is adequately treated.       Some things to remember:  -Oral devices are often, but not always, covered by your medical insurance. Be sure to check with your insurance provider.   -If you are referred for oral therapy, you will be given a list of specialized dentists to consider or you may choose to visit the Web site of the American Academy of Dental Sleep Medicine  -Oral devices are less likely to work if you have severe sleep apnea or are extremely overweight.     More detailed information  An oral appliance is a small acrylic device that fits over the upper and lower teeth  (similar to a retainer or a mouth guard). This device slightly moves jaw forward, which moves the base of the tongue forward, opens the airway, improves breathing for effective treat snoring and obstructive sleep apnea in perhaps 7 out of 10 people .  The best working devices are custom-made by a dental device  after a mold is made of the teeth 1, 2, 3.  When is an oral appliance indicated?  Oral appliance therapy is recommended as a first-line treatment for patients with primary snoring, mild  sleep apnea, and for patients with moderate sleep apnea who prefer appliance therapy to use of CPAP4, 5. Severity of sleep apnea is determined by sleep testing and is based on the number of respiratory events per hour of sleep.   How successful is oral appliance therapy?  The success rate of oral appliance therapy in patients with mild sleep apnea is 75-80% while in patients with moderate sleep apnea it is 50-70%. The chance of success in patients with severe sleep apnea is 40-50%. The research also shows that oral appliances have a beneficial effect on the cardiovascular health of ARIANNA patients at the same magnitude as CPAP therapy7.  Oral appliances should be a second-line treatment in cases of severe sleep apnea, but if not completely successful then a combination therapy utilizing CPAP plus oral appliance therapy may be effective. Oral appliances tend to be effective in a broad range of patients although studies show that the patients who have the highest success are females, younger patients, those with milder disease, and less severe obesity. 3, 6.   Finding a dentist that practices dental sleep medicine  Specific training is available through the American Academy of Dental Sleep Medicine for dentists interested in working in the field of sleep. To find a dentist who is educated in the field of sleep and the use of oral appliances, near you, visit the Web site of the American Academy of Dental Sleep Medicine.    References  1. Elias et al. Objectively measured vs self-reported compliance during oral appliance therapy for sleep-disordered breathing. Chest 2013; 144(5): 8156-2151.  2. Shubham, et al. Objective measurement of compliance during oral appliance therapy for sleep-disordered breathing. Thorax 2013; 68(1): 91-96.  3. Omid et al. Mandibular advancement devices in 620 men and women with ARIANNA and snoring: tolerability and predictors of treatment success. Chest 2004; 125: 1949-6286.  4.  Bj et al. Oral appliances for snoring and ARIANNA: a review. Sleep 2006; 29: 244-262.  5. Silvestre et al. Oral appliance treatment for ARIANNA: an update. J Clin Sleep Med 2014; 10(2): 215-227.  6. ayde Alvarado al. Predictors of OSAH treatment outcome. J Dent Res 2007; 86: 6935-9704.      Weight Loss:    Weight loss is a long-term strategy that may improve sleep apnea in some patients.    Weight management is a personal decision and the decision should be based on your interest and the potential benefits.  If you are interested in exploring weight loss strategies, the following discussion covers the impact on weight loss on sleep apnea and the approaches that may be successful.    Being overweight does not necessarily mean you will have health consequences.  Those who have BMI over 35 or over 27 with existing medical conditions carries greater risk.   Weight loss decreases severity of sleep apnea in most people with obesity. For those with mild obesity who have developed snoring with weight gain, even 15-30 pound weight loss can improve and occasionally eliminate sleep apnea.  Structured and life-long dietary and health habits are necessary to lose weight and keep healthier weight levels.     Though there may be significant health benefits from weight loss, long-term weight loss is very difficult to achieve- studies show success with dietary management in less than 10% of people. In addition, substantial weight loss may require years of dietary control and may be difficult if patients have severe obesity. In these cases, surgical management may be considered.  Finally, older individuals who have tolerated obesity without health complications may be less likely to benefit from weight loss strategies.        Your BMI is Body mass index is 28.73 kg/(m^2).  Weight management is a personal decision.  If you are interested in exploring weight loss strategies, the following discussion covers the approaches that may be  successful. Body mass index (BMI) is one way to tell whether you are at a healthy weight, overweight, or obese. It measures your weight in relation to your height.  A BMI of 18.5 to 24.9 is in the healthy range. A person with a BMI of 25 to 29.9 is considered overweight, and someone with a BMI of 30 or greater is considered obese. More than two-thirds of American adults are considered overweight or obese.  Being overweight or obese increases the risk for further weight gain. Excess weight may lead to heart disease and diabetes.  Creating and following plans for healthy eating and physical activity may help you improve your health.  Weight control is part of healthy lifestyle and includes exercise, emotional health, and healthy eating habits. Careful eating habits lifelong are the mainstay of weight control. Though there are significant health benefits from weight loss, long-term weight loss with diet alone may be very difficult to achieve- studies show long-term success with dietary management in less than 10% of people. Attaining a healthy weight may be especially difficult to achieve in those with severe obesity. In some cases, medications, devices and surgical management might be considered.  What can you do?  If you are overweight or obese and are interested in methods for weight loss, you should discuss this with your provider.     Consider reducing daily calorie intake by 500 calories.     Keep a food journal.     Avoiding skipping meals, consider cutting portions instead.    Diet combined with exercise helps maintain muscle while optimizing fat loss. Strength training is particularly important for building and maintaining muscle mass. Exercise helps reduce stress, increase energy, and improves fitness. Increasing exercise without diet control, however, may not burn enough calories to loose weight.       Start walking three days a week 10-20 minutes at a time    Work towards walking thirty minutes five days a  week     Eventually, increase the speed of your walking for 1-2 minutes at time    In addition, we recommend that you review healthy lifestyles and methods for weight loss available through the National Institutes of Health patient information sites:  http://win.niddk.nih.gov/publications/index.htm    And look into health and wellness programs that may be available through your health insurance provider, employer, local community center, or erasto club.    Weight management plan: Patient was referred to their PCP to discuss a diet and exercise plan.      Surgery:    Surgery for obstructive sleep apnea is considered generally only when other therapies fail to work. Surgery may be discussed with you if you are having a difficult time tolerating CPAP and or when there is an abnormal structure that requires surgical correction.  Nose and throat surgeries often enlarge the airway to prevent collapse.  Most of these surgeries create pain for 1-2 weeks and up to half of the most common surgeries are not effective throughout life.  You should carefully discuss the benefits and drawbacks to surgery with your sleep provider and surgeon to determine if it is the best solution for you.   More information  Surgery for ARIANNA is directed at areas that are responsible for narrowing or complete obstruction of the airway during sleep.  There are a wide range of procedures available to enlarge and/or stabilize the airway to prevent blockage of breathing in the three major areas where it can occur: the palate, tongue, and nasal regions.  Successful surgical treatment depends on the accurate identification of the factors responsible for obstructive sleep apnea in each person.  A personalized approach is required because there is no single treatment that works well for everyone.  Because of anatomic variation, consultation with an examination by a sleep surgeon is a critical first step in determining what surgical options are best for each  patient.  In some cases, examination during sedation may be recommended in order to guide the selection of procedures.  Patients will be counseled about risks and benefits as well as the typical recovery course after surgery. Surgery is typically not a cure for a person s ARIANNA.  However, surgery will often significantly improve one s ARIANNA severity (termed  success rate ).  Even in the absence of a cure, surgery will decrease the cardiovascular risk associated with OSA7; improve overall quality of life8 (sleepiness, functionality, sleep quality, etc).      Palate Procedures:  Patients with ARIANNA often have narrowing of their airway in the region of their tonsils and uvula.  The goals of palate procedures are to widen the airway in this region as well as to help the tissues resist collapse.  Modern palate procedure techniques focus on tissue conservation and soft tissue rearrangement, rather than tissue removal.  Often the uvula is preserved in this procedure. Residual sleep apnea is common in patient after pharyngoplasty with an average reduction in sleep apnea events of 33%2.      Tongue Procedures:  ExamWhile patients are awake, the muscles that surround the throat are active and keep this region open for breathing. These muscles relax during sleep, allowing the tongue and other structures to collapse and block breathing.  There are several different tongue procedures available.  Selection of a tongue base procedure depends on characteristics seen on physical exam.  Generally, procedures are aimed at removing bulky tissues in this area or preventing the back of the tongue from falling back during sleep.  Success rates for tongue surgery range from 50-62%3.    Hypoglossal Nerve Stimulation:  Hypoglossal nerve stimulation has recently received approval from the [place] Food and Drug Administration for the treatment of obstructive sleep apnea.  This is based on research showing that the system was safe and effective  in treating sleep apnea6.  Results showed that the median AHI score decreased 68%, from 29.3 to 9.0. This therapy uses an implant system that senses breathing patterns and delivers mild stimulation to airway muscles, which keeps the airway open during sleep.  The system consists of three fully implanted components: a small generator (similar in size to a pacemaker), a breathing sensor, and a stimulation lead.  Using a small handheld remote, a patient turns the therapy on before bed and off upon awakening.    Candidates for this device must be greater than 22 years of age, have moderate to severe ARIANNA (AHI between 20-65), BMI less than 32, have tried CPAP/oral appliance without success, and have appropriate upper airway anatomy (determined by a sleep endoscopy performed by Dr. Whitehead).    Hypoglossal Nerve Stimulation Pathway:    The sleep surgeon s office will work with the patient through the insurance prior-authorization process (including communications and appeals).    Nasal Procedures:  Nasal obstruction can interfere with nasal breathing during the day and night.  Studies have shown that relief of nasal obstruction can improve the ability of some patients to tolerate positive airway pressure therapy for obstructive sleep apnea1.  Treatment options include medications such as nasal saline, topical corticosteroid and antihistamine sprays, and oral medications such as antihistamines or decongestants. Non-surgical treatments can include external nasal dilators for selected patients. If these are not successful by themselves, surgery can improve the nasal airway either alone or in combination with these other options.      Combination Procedures:  Combination of surgical procedures and other treatments may be recommended, particularly if patients have more than one area of narrowing or persistent positional disease.  The success rate of combination surgery ranges from 66-80%2,3.    References  1. Abel JENSEN The Role  of the Nose in Snoring and Obstructive Sleep Apnoea: An Update.  Eur Arch Otorhinolaryngol. 2011; 268: 1365-73.  2.  Bere SM; Jaylen JA; Mary Carmen JR; Pallanch JF; Dixie MB; Arnold SG; Angelica ASNDS. Surgical modifications of the upper airway for obstructive sleep apnea in adults: a systematic review and meta-analysis. SLEEP 2010;33(10):3245-9203. Rohit AHN. Hypopharyngeal surgery in obstructive sleep apnea: an evidence-based medicine review.  Arch Otolaryngol Head Neck Surg. 2006 Feb;132(2):206-13.  3. Sunny YH1, Chandrakant Y, Shashi MILLY. The efficacy of anatomically based multilevel surgery for obstructive sleep apnea. Otolaryngol Head Neck Surg. 2003 Oct;129(4):327-35.  4. Rohit AHN, Goldberg A. Hypopharyngeal Surgery in Obstructive Sleep Apnea: An Evidence-Based Medicine Review. Arch Otolaryngol Head Neck Surg. 2006 Feb;132(2):206-13.  5. Jasmin TRIPP et al. Upper-Airway Stimulation for Obstructive Sleep Apnea.  N Engl J Med. 2014 Jan 9;370(2):139-49.  6. César Y et al. Increased Incidence of Cardiovascular Disease in Middle-aged Men with Obstructive Sleep Apnea. Am J Respir Crit Care Med; 2002 166: 159-165  7. Olivia EM et al. Studying Life Effects and Effectiveness of Palatopharyngoplasty (SLEEP) study: Subjective Outcomes of Isolated Uvulopalatopharyngoplasty. Otolaryngol Head Neck Surg. 2011; 144: 623-631.  Stress, tension, and anxiety can be big factors contributing to insomnia.  If you can t relax your mind and body, then you won t be able to sleep.  You would likely benefit from trying some of the relaxation exercises that we ve assembled below.  These are all internet based links.  If you don t have or use a computer, you may benefit from one of the stress management books listed below that you can get at your public library or at a local bookstore for a relatively low price.      Copy and paste into web browser address window   https://www.Jounce.com/watch?v=Mxsn9qqZtBz    Progressive Muscle Relaxation (PMR):      http://www.Hypios/progressive-muscle-relaxation-exercise.html     http://studentsupport.[facility]/counseling/resources/self-help/relaxation-and-stress-management/   Deep Breathing Exercises:    http://www.Hypios/breathing-awareness.html     Meditation:     wwwHumanAPI    www.JoppelguidedRedbiotecmeditation-site.com You may have to pay for some of these resources.    Guided Imagery:    http://www.Hypios/guided-imagery-scripts.html     http://Encap/library/smvaeptgnp-fpjxjg-acsgrtf/    [place] site under construction : http://www.[facility].org/Services/SleepMedicine/Audio/index.htm    Sometimes insomnia can be made worse by our own habits or situation.  You should consider making some of the following changes to help improve your sleep:     If there is excessive noise in your house / neighborhood use a fan or similar device to make a quiet background noise that can drown out other noises    If your room is too bright early in the morning, hang a blanket or extra curtain over the windows to keep the light out or use a sleeping mask to cover your eyes    If your room is too warm during the night, set the temperature in the house down a few degrees for the night    Spend a little time before bedtime trying to relax.  Don t work right up until bedtime.  Take 30-60 minutes to relax and unwind before bedtime with a book or watching TV    Do not drink anything with alcohol or caffeine in them for at least 4-5 hours before bedtime    Do not smoke right before bedtime or during the night    No strenuous exercise for 2-3 hours before bedtime    A common problem for people with insomnia is spending too much time in bed  trying  to sleep.  You really should only be in bed for no more than 7-8 hours per night.  Spending too much time in bed can lead to being awake and having an  overactive  mind.  One effective way to address this problem is reducing how  much time you spend in bed each night.  Be careful with driving or other dangerous activities when trying these strategeis however.  We recommend that you follow these steps to improve your insomnia:    Set a new sleep schedule where you reduce the time you spend in bed by 1-2 hours, e.g. Go to bed at 11 and get up at 6    Keep this sleep schedule every day of the week including the weekends for two weeks.    After two weeks you can add 30-60 minutes more time in bed if you have been sleeping more soundly.    If you still aren t sleeping well, reduce the time you spend in bed by another 30-60 but not less than 5 hours per night.    Please keep a log or diary during this time to track your sleep pattern                      Follow-ups after your visit        Follow-up notes from your care team     Return in about 2 weeks (around 3/7/2018).      Who to contact     If you have questions or need follow up information about today's clinic visit or your schedule please contact [facility] directly at [phone].  Normal or non-critical lab and imaging results will be communicated to you by [facility], letter or phone within 4 business days after the clinic has received the results. If you do not hear from us within 7 days, please contact the clinic through [employer] or phone. If you have a critical or abnormal lab result, we will notify you by phone as soon as possible.  Submit refill requests through [employer] or call your pharmacy and they will forward the refill request to us. Please allow 3 business days for your refill to be completed.          Additional Information About Your Visit        [employer] Information     [employer] gives you secure access to your electronic health record. If you see a primary care provider, you can also send messages to your care team and make appointments. If you have questions, please call your primary care clinic.  If you do not have a primary care provider, please call  "[phone] and they will assist you.        Care EveryWhere ID     This is your Care EveryWhere ID. This could be used by other organizations to access your [place] medical records  [id number]        Your Vitals Were     Pulse Respirations Height Pulse Oximetry BMI (Body Mass Index)       91 18 1.822 m (5' 11.75\") 95% 28.73 kg/m2        Blood Pressure from Last 3 Encounters:   02/21/18 129/71   02/12/18 136/78   04/28/17 126/74    Weight from Last 3 Encounters:   02/21/18 95.4 kg (210 lb 6.4 oz)   02/12/18 96.6 kg (213 lb)   04/28/17 91.6 kg (202 lb)              We Performed the Following     SLEEP EVALUATION & MANAGEMENT REFERRAL - ADULT -[facility] [place] [phone] (Age 13 if over 100 lbs)        Primary Care Provider Office Phone # Fax #    Javon Domínguez [phone]       [facility] [address] [place] [address]        Equal Access to Services     [facility] INNA : Hadii aad ku hadasho Soomaali, waaxda luqadaha, qaybta kaalmada adeegyaspencer, kierra pickens. So [facility] [phone].    ATENCIÓN: Si habla español, tiene a francisco disposición servicios gratuitos de asistencia lingüística. Simone al [phone].    We comply with applicable federal civil rights laws and [place] laws. We do not discriminate on the basis of race, color, national origin, age, disability, sex, sexual orientation, or gender identity.            Thank you!     Thank you for choosing [facility]  for your care. Our goal is always to provide you with excellent care. Hearing back from our patients is one way we can continue to improve our services. Please take a few minutes to complete the written survey that you may receive in the mail after your visit with us. Thank you!             Your Updated Medication List - Protect others around you: Learn how to safely use, store and throw away your medicines at www.disposemymeds.org.          This " list is accurate as of 2/21/18 10:04 AM.  Always use your most recent med list.                   Brand Name Dispense Instructions for use Diagnosis    aspirin 81 MG tablet     100    ONE DAILY    Other and unspecified hyperlipidemia       atorvastatin 20 MG tablet    LIPITOR    90 tablet    Take 1 tablet (20 mg) by mouth daily    Hyperlipidemia LDL goal <130       Fish Oil 500 MG Caps      1 capsule daily        SLEEP AID 25 MG Tabs tablet   Generic drug:  doxylamine      Take 25 mg by mouth nightly as needed        tadalafil 20 MG tablet    CIALIS    6 tablet    Take 0.5-1 tablets (10-20 mg) by mouth daily as needed for erectile dysfunction Never use with nitroglycerin, terazosin or doxazosin.    Erectile dysfunction, unspecified erectile dysfunction type

## 2018-02-21 NOTE — PROGRESS NOTES
Sleep Consultation:    Date on this visit: 2/21/2018    Jaswinder Thompson Sr. is sent by Javon Domínguez for a sleep consultation regarding snoring, witnessed apneas, sleep maintenance Insomnia.     Primary Physician: Javon Domínguez     Jaswinder Thompson . reports frequent snoring and poor quality of sleep for many years.     Jaswinder goes to sleep at 10:00 PM during the week. He wakes up at 7:00 AM without an alarm. He falls asleep in 5 minutes.  Jaswinder denies difficulty falling asleep.  He wakes up 2-4 times a night for 2-3 hours  before falling back to sleep.  Jaswinder wakes up to uncertain reasons.  On weekends, Jaswinder goes to sleep at 10:00 PM.  He wakes up at 7:00 AM without an alarm. He falls asleep in 5 minutes.  Patient gets an average of 6 hours of sleep per night.     Patient does not use electronics in bed, watch TV in bed and read in bed.     Jaswinder does not do shift work.      Jaswinder does snore frequently. Patient does have a regular bed partner. There is report of snoring and poor quality of sleep.  He does have witnessed apneas. They never sleep separately.  Patient sleeps on his back, side and stomach. He has occasional snort arousals and morning dry mouth,. Jaswinder denies any bruxism, sleep walking, sleep talking, dream enactment, sleep paralysis, cataplexy and hypnogogic/hypnopompic hallucinations.    He denies sleep walking, sleep talking and sleep terrors as a child.  Jaswinder has heartburn.      Jaswinder has gained 5-10 pounds in the last year.  Patient describes themself as a morning person.  He would prefer to go to sleep at 11:00 PM and wake up at 8:00 AM.  Patient's [place] Sleepiness score 3/24 inconsistent with excessive  daytime sleepiness.      Jaswinder naps 0 times per week.   He denies closing eyes, dozing and falling asleep while driving. Patient was counseled on the importance of driving while alert, to pull over if drowsy, or nap before getting into the vehicle if sleepy.  He uses  1-2 cups/day of coffee. Last caffeine intake is usually before noon.    Allergies:    Allergies   Allergen Reactions     Nickel      Patch testing elsewhere per patient report     No Known Drug Allergies        Medications:    Current Outpatient Prescriptions   Medication Sig Dispense Refill     doxylamine (SLEEP AID) 25 MG TABS tablet Take 25 mg by mouth nightly as needed       atorvastatin (LIPITOR) 20 MG tablet Take 1 tablet (20 mg) by mouth daily 90 tablet 3     tadalafil (CIALIS) 20 MG tablet Take 0.5-1 tablets (10-20 mg) by mouth daily as needed for erectile dysfunction Never use with nitroglycerin, terazosin or doxazosin. 6 tablet 3     ASPIRIN 81 MG OR TABS ONE DAILY 100 3     FISH  MG OR CAPS 1 capsule daily         Problem List:  Patient Active Problem List    Diagnosis Date Noted     S/P rotator cuff repair left 11/30/2015     Priority: Medium     Advanced directives, counseling/discussion 03/21/2012     Priority: Medium     Advance Directive Problem List Overview:   Name Relationship Phone    Primary Health Care Agent            Alternative Health Care Agent          Discussed advance care planning with patient; information given to patient to review. 3/21/2012          Encounter for long-term current use of medication 03/16/2011     Priority: Medium     Problem list name updated by automated process. 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  [facility].     COLONOSCOPY  3/28/2011    COMBINED COLONOSCOPY, SINGLE BIOPSY/POLYPECTOMY BY BIOPSY performed by DONAVON GOTTLIEB at  GI     COLONOSCOPY N/A 5/2/2016    Procedure: COMBINED COLONOSCOPY, SINGLE OR MULTIPLE BIOPSY/POLYPECTOMY BY BIOPSY;  Surgeon: Alec Mclain MD;  Location:  GI     HC EXCISION BREAST LESION, OPEN >=1  04/14/08    Right     HC REMOVE TONSILS/ADENOIDS,<13 Y/O      unsure of age       Social History:  Social History     Social History     Marital status:      Spouse name: N/A     Number of children: N/A     Years of education: N/A     Occupational History     Not on file.     Social History Main Topics     Smoking status: Former Smoker     Quit date: 7/1/2005     Smokeless tobacco: Never Used     Alcohol use 0.0 oz/week     0 Standard drinks or equivalent per week      Comment: < one twelve pack beer per week     Drug use: No     Sexual activity: Yes     Partners: Female     Other Topics Concern      Service Yes     Blood Transfusions No     Caffeine Concern No     Occupational Exposure No     Hobby Hazards No     Sleep Concern No     Stress Concern No     Weight Concern No     Special Diet No     Back Care No     Exercise Yes     2-3 x's per week     Bike Helmet No     doesn't ride     Seat Belt Yes     Self-Exams No     Parent/Sibling W/ Cabg, Mi Or Angioplasty Before 65f 55m? No     Social History  "Narrative       Family History:  Family History   Problem Relation Age of Onset     Hypertension Father      Allergies Father      bees     Alzheimer Disease Father      Arthritis Father      CEREBROVASCULAR DISEASE Mother      Eye Disorder Mother      cataract     Breast Cancer Sister      HEART DISEASE Sister      HEART DISEASE Paternal Grandfather      CANCER Brother      Cardiovascular Brother      Stent placed in his 60's     CANCER Sister      lung cancer     Other Cancer Son      kidney cancer       Review of Systems:  A complete review of systems reviewed by me is negative with the exeption of what has been mentioned in the history of present illness.  CONSTITUTIONAL: NEGATIVE for weight gain/loss, fever, chills, sweats or night sweats, drug allergies.  EYES: NEGATIVE for changes in vision, blind spots, double vision.  ENT: NEGATIVE for ear pain, sore throat, sinus pain, post-nasal drip, runny nose, bloody nose  CARDIAC:  POSITIVE for  fast heart beats  NEUROLOGIC: NEGATIVE headaches, weakness or numbness in the arms or legs.  DERMATOLOGIC: NEGATIVE for rashes, new moles or change in mole(s)  PULMONARY: NEGATIVE SOB at rest, SOB with activity, dry cough, productive cough, coughing up blood, wheezing or whistling when breathing.    GASTROINTESTINAL: NEGATIVE for nausea or vomitting, loose or watery stools, fat or grease in stools, constipation, abdominal pain, bowel movements black in color or blood noted.  GENITOURINARY: NEGATIVE for pain during urination, blood in urine, urinating more frequently than usual, irregular menstrual periods.  MUSCULOSKELETAL: NEGATIVE for muscle pain, bone or joint pain, swollen joints.  ENDOCRINE: NEGATIVE for increased thirst or urination, diabetes.  LYMPHATIC: NEGATIVE for swollen lymph nodes, lumps or bumps in the breasts or nipple discharge.    Physical Examination:  Vitals: /71  Pulse 91  Resp 18  Ht 1.822 m (5' 11.75\")  Wt 95.4 kg (210 lb 6.4 oz)  SpO2 95%  " BMI 28.73 kg/m2  BMI= Body mass index is 28.73 kg/(m^2).    Neck Cir (cm): 42 cm    No flowsheet data found.    GENERAL APPEARANCE: healthy, alert and no distress  HENT: nose and mouth without ulcers or lesions and normal cephalic/atraumatic  NECK: no adenopathy, no asymmetry, masses, or scars, thyroid normal to palpation and trachea midline and normal to palpation  RESP: lungs clear to auscultation - no rales, rhonchi or wheezes  CV: regular rates and rhythm, normal S1 S2, no S3 or S4 and no murmur, click or rub  MS: extremities normal- no gross deformities noted  PSYCH: mentation appears normal and affect normal/bright  Mallampati Class: II.  Tonsillar Stage: 0  surgically removed.    Impression/Plan:  Sleep maintenance insomnia- present for the last 20 years. Discussed sleep hygiene, conservative measures including meditation, avoiding  Being in bed and not sleeping, and keeping a sleep schedule.   Possible sleep apnea-will call if he would like to pursue a sleep study. Will try to manage the Insomnia first.   Literature provided regarding sleep apnea and insomnia.      Follow up 3 months or sooner if needed.       Polysomnography reviewed.  Obstructive sleep apnea reviewed.  Complications of untreated sleep apnea were reviewed.      CC: Javon Domínguez

## 2018-02-21 NOTE — NURSING NOTE
"No chief complaint on file.      Initial /71  Pulse 91  Resp 18  Ht 1.822 m (5' 11.75\")  Wt 95.4 kg (210 lb 6.4 oz)  SpO2 95%  BMI 28.73 kg/m2 Estimated body mass index is 28.73 kg/(m^2) as calculated from the following:    Height as of this encounter: 1.822 m (5' 11.75\").    Weight as of this encounter: 95.4 kg (210 lb 6.4 oz).  Medication Reconciliation: complete     Neck circumference: 42cm    Stop bang = 5    Jia Chamorro CMA            "

## 2018-02-21 NOTE — PATIENT INSTRUCTIONS
"MY TREATMENT INFORMATION FOR SLEEP APNEA-  Jaswinder Thompson Sr.    DOCTOR : Stephon Benitez  SLEEP CENTER :      MY CONTACT NUMBER:     Am I having a sleep study at a sleep center?  Make sure you have an appointment for the study before you leave!    Am I having a home sleep study?  Watch this video:  https://www.virocyt.com/watch?v=CteI_GhyP9g&list=PLC4F_nvCEvSxpvRkgPszaicmjcb2PMExm    Frequently asked questions:  1. What is Obstructive Sleep Apnea (ARIANNA)? ARIANNA is the most common type of sleep apnea. Apnea means, \"without breath.\"  Apnea is most often caused by narrowing or collapse of the upper airway as muscles relax during sleep.   Almost everyone has occasional apneas. Most people with sleep apnea have had brief interruptions at night frequently for many years.  The severity of sleep apnea is related to how frequent and severe the events are.   2. What are the consequences of ARIANNA? Symptoms include: feeling sleepy during the day, snoring loudly, gasping or stopping of breathing, trouble sleeping, and occasionally morning headaches or heartburn at night.  Sleepiness can be serious and even increase the risk of falling asleep while driving. Other health consequences may include development of high blood pressure and other cardiovascular disease in persons who are susceptible. Untreated ARIANNA  can contribute to heart disease, stroke and diabetes.   3. What are the treatment options? In most situations, sleep apnea is a lifelong disease that must be managed with daily therapy. Medications are not effective for sleep apnea and surgery is generally not considered until other therapies have been tried. Your treatment is your choice . Continuous Positive Airway (CPAP) works right away and is the therapy that is effective in nearly everyone. An oral device to hold your jaw forward is usually the next most reliable option. Other options include postioning devices (to keep you off your back), weight loss, and surgery including " a tongue pacing device. There is more detail about some of these options below.    Important tips for using CPAP and similar devices   Know your equipment:  CPAP is continuous positive airway pressure that prevents obstructive sleep apnea by keeping the throat from collapsing while you are sleeping. In most cases, the device is  smart  and can slowly self-adjusts if your throat collapses and keeps a record every day of how well you are treated-this information is available to you and your care team.  BPAP is bilevel positive airway pressure that keeps your throat open and also assists each breath with a pressure boost to maintain adequate breathing.  Special kinds of BPAP are used in patients who have inadequate breathing from lung or heart disease. In most cases, the device is  smart  and can slowly self-adjusts to assist breathing. Like CPAP, the device keeps a record of how well you are treated.  Your mask is your connection to the device. You get to choose what feels most comfortable and the staff will help to make sure if fits. Here: are some examples of the different masks that are available:       Key points to remember on your journey with sleep apnea:  1. Sleep study.  PAP devices often need to be adjusted during a sleep study to show that they are effective and adjusted right.  2. Good tips to remember: Try wearing just the mask during a quiet time during the day so your body adapts to wearing it. A humidifier is recommended for comfort in most cases to prevent drying of your nose and throat. Allergy medication from your provider may help you if you are having nasal congestion.  3. Getting settled-in. It takes more than one night for most of us to get used to wearing a mask. Try wearing just the mask during a quiet time during the day so your body adapts to wearing it. A humidifier is recommended for comfort in most cases. Our team will work with you carefully on the first day and will be in contact within  4 days and again at 2 and 4 weeks for advice and remote device adjustments. Your therapy is evaluated by the device each day.   4. Use it every night. The more you are able to sleep naturally for 7-8 hours, the more likely you will have good sleep and to prevent health risks or symptoms from sleep apnea. Even if you use it 4 hours it helps. Occasionally all of us are unable to use a medical therapy, in sleep apnea, it is not dangerous to miss one night.   5. Communicate. Call our skilled team on the number provided on the first day if your visit for problems that make it difficult to wear the device. Over 2 out of 3 patients can learn to wear the device long-term with help from our team. Remember to call our team or your sleep providers if you are unable to wear the device as we may have other solutions for those who cannot adapt to mask CPAP therapy. It is recommended that you sleep your sleep provider within the first 3 months and yearly after that if you are not having problems.   Take care of your equipment. Make sure you clean your mask and tubing using directions every day and that your filter and mask are replaced as recommended or if they are not working.     BESIDES CPAP, WHAT OTHER THERAPIES ARE THERE?    Positioning Device  Positioning devices are generally used when sleep apnea is mild and only occurs on your back.This example shows a pillow that straps around the waist. It may be appropriate for those whose sleep study shows milder sleep apnea that occurs primarily when lying flat on one's back. Preliminary studies have shown benefit but effectiveness at home may need to be verified by a home sleep test. These devices are generally not covered by medical insurance.  Examples of devices that maintain sleeping on the back to prevent snoring and mild sleep apnea.    Belt type body positioner  Http://Care.com.[employer]/    Electronic reminder  Http://nightshifttherapy.com/  Http://www.nChannelpod.com.au/    Oral  Appliance  What is oral appliance therapy?  An oral appliance device fits on your teeth at night like a retainer used after having braces. The device is made by a specialized dentist and requires several visits over 1-2 months before a manufactured device is made to fit your teeth and is adjusted to prevent your sleep apnea. Once an oral device is working properly, snoring should be improved. A home sleep test may be recommended at that time if to determine whether the sleep apnea is adequately treated.       Some things to remember:  -Oral devices are often, but not always, covered by your medical insurance. Be sure to check with your insurance provider.   -If you are referred for oral therapy, you will be given a list of specialized dentists to consider or you may choose to visit the Web site of the American Academy of Dental Sleep Medicine  -Oral devices are less likely to work if you have severe sleep apnea or are extremely overweight.     More detailed information  An oral appliance is a small acrylic device that fits over the upper and lower teeth  (similar to a retainer or a mouth guard). This device slightly moves jaw forward, which moves the base of the tongue forward, opens the airway, improves breathing for effective treat snoring and obstructive sleep apnea in perhaps 7 out of 10 people .  The best working devices are custom-made by a dental device  after a mold is made of the teeth 1, 2, 3.  When is an oral appliance indicated?  Oral appliance therapy is recommended as a first-line treatment for patients with primary snoring, mild sleep apnea, and for patients with moderate sleep apnea who prefer appliance therapy to use of CPAP4, 5. Severity of sleep apnea is determined by sleep testing and is based on the number of respiratory events per hour of sleep.   How successful is oral appliance therapy?  The success rate of oral appliance therapy in patients with mild sleep apnea is 75-80% while  in patients with moderate sleep apnea it is 50-70%. The chance of success in patients with severe sleep apnea is 40-50%. The research also shows that oral appliances have a beneficial effect on the cardiovascular health of ARIANNA patients at the same magnitude as CPAP therapy7.  Oral appliances should be a second-line treatment in cases of severe sleep apnea, but if not completely successful then a combination therapy utilizing CPAP plus oral appliance therapy may be effective. Oral appliances tend to be effective in a broad range of patients although studies show that the patients who have the highest success are females, younger patients, those with milder disease, and less severe obesity. 3, 6.   Finding a dentist that practices dental sleep medicine  Specific training is available through the American Academy of Dental Sleep Medicine for dentists interested in working in the field of sleep. To find a dentist who is educated in the field of sleep and the use of oral appliances, near you, visit the Web site of the American Academy of Dental Sleep Medicine.    References  1. Elias, et al. Objectively measured vs self-reported compliance during oral appliance therapy for sleep-disordered breathing. Chest 2013; 144(5): 1215-3913.  2. Shubham, et al. Objective measurement of compliance during oral appliance therapy for sleep-disordered breathing. Thorax 2013; 68(1): 91-96.  3. Omid, et al. Mandibular advancement devices in 620 men and women with ARIANNA and snoring: tolerability and predictors of treatment success. Chest 2004; 125: 4166-4198.  4. Lorenzo, et al. Oral appliances for snoring and ARIANNA: a review. Sleep 2006; 29: 244-262.  5. Silvestre et al. Oral appliance treatment for ARIANNA: an update. J Clin Sleep Med 2014; 10(2): 215-227.  6. Christiano, et al. Predictors of OSAH treatment outcome. J Dent Res 2007; 86: 9966-1800.      Weight Loss:    Weight loss is a long-term strategy that may improve sleep apnea  in some patients.    Weight management is a personal decision and the decision should be based on your interest and the potential benefits.  If you are interested in exploring weight loss strategies, the following discussion covers the impact on weight loss on sleep apnea and the approaches that may be successful.    Being overweight does not necessarily mean you will have health consequences.  Those who have BMI over 35 or over 27 with existing medical conditions carries greater risk.   Weight loss decreases severity of sleep apnea in most people with obesity. For those with mild obesity who have developed snoring with weight gain, even 15-30 pound weight loss can improve and occasionally eliminate sleep apnea.  Structured and life-long dietary and health habits are necessary to lose weight and keep healthier weight levels.     Though there may be significant health benefits from weight loss, long-term weight loss is very difficult to achieve- studies show success with dietary management in less than 10% of people. In addition, substantial weight loss may require years of dietary control and may be difficult if patients have severe obesity. In these cases, surgical management may be considered.  Finally, older individuals who have tolerated obesity without health complications may be less likely to benefit from weight loss strategies.        Your BMI is Body mass index is 28.73 kg/(m^2).  Weight management is a personal decision.  If you are interested in exploring weight loss strategies, the following discussion covers the approaches that may be successful. Body mass index (BMI) is one way to tell whether you are at a healthy weight, overweight, or obese. It measures your weight in relation to your height.  A BMI of 18.5 to 24.9 is in the healthy range. A person with a BMI of 25 to 29.9 is considered overweight, and someone with a BMI of 30 or greater is considered obese. More than two-thirds of American adults  are considered overweight or obese.  Being overweight or obese increases the risk for further weight gain. Excess weight may lead to heart disease and diabetes.  Creating and following plans for healthy eating and physical activity may help you improve your health.  Weight control is part of healthy lifestyle and includes exercise, emotional health, and healthy eating habits. Careful eating habits lifelong are the mainstay of weight control. Though there are significant health benefits from weight loss, long-term weight loss with diet alone may be very difficult to achieve- studies show long-term success with dietary management in less than 10% of people. Attaining a healthy weight may be especially difficult to achieve in those with severe obesity. In some cases, medications, devices and surgical management might be considered.  What can you do?  If you are overweight or obese and are interested in methods for weight loss, you should discuss this with your provider.     Consider reducing daily calorie intake by 500 calories.     Keep a food journal.     Avoiding skipping meals, consider cutting portions instead.    Diet combined with exercise helps maintain muscle while optimizing fat loss. Strength training is particularly important for building and maintaining muscle mass. Exercise helps reduce stress, increase energy, and improves fitness. Increasing exercise without diet control, however, may not burn enough calories to loose weight.       Start walking three days a week 10-20 minutes at a time    Work towards walking thirty minutes five days a week     Eventually, increase the speed of your walking for 1-2 minutes at time    In addition, we recommend that you review healthy lifestyles and methods for weight loss available through the National Institutes of Health patient information sites:  http://win.niddk.nih.gov/publications/index.htm    And look into health and wellness programs that may be available  through your health insurance provider, employer, local community center, or erasto club.    Weight management plan: Patient was referred to their PCP to discuss a diet and exercise plan.      Surgery:    Surgery for obstructive sleep apnea is considered generally only when other therapies fail to work. Surgery may be discussed with you if you are having a difficult time tolerating CPAP and or when there is an abnormal structure that requires surgical correction.  Nose and throat surgeries often enlarge the airway to prevent collapse.  Most of these surgeries create pain for 1-2 weeks and up to half of the most common surgeries are not effective throughout life.  You should carefully discuss the benefits and drawbacks to surgery with your sleep provider and surgeon to determine if it is the best solution for you.   More information  Surgery for ARIANNA is directed at areas that are responsible for narrowing or complete obstruction of the airway during sleep.  There are a wide range of procedures available to enlarge and/or stabilize the airway to prevent blockage of breathing in the three major areas where it can occur: the palate, tongue, and nasal regions.  Successful surgical treatment depends on the accurate identification of the factors responsible for obstructive sleep apnea in each person.  A personalized approach is required because there is no single treatment that works well for everyone.  Because of anatomic variation, consultation with an examination by a sleep surgeon is a critical first step in determining what surgical options are best for each patient.  In some cases, examination during sedation may be recommended in order to guide the selection of procedures.  Patients will be counseled about risks and benefits as well as the typical recovery course after surgery. Surgery is typically not a cure for a person s ARIANNA.  However, surgery will often significantly improve one s ARIANNA severity (termed  success  rate ).  Even in the absence of a cure, surgery will decrease the cardiovascular risk associated with OSA7; improve overall quality of life8 (sleepiness, functionality, sleep quality, etc).      Palate Procedures:  Patients with ARIANNA often have narrowing of their airway in the region of their tonsils and uvula.  The goals of palate procedures are to widen the airway in this region as well as to help the tissues resist collapse.  Modern palate procedure techniques focus on tissue conservation and soft tissue rearrangement, rather than tissue removal.  Often the uvula is preserved in this procedure. Residual sleep apnea is common in patient after pharyngoplasty with an average reduction in sleep apnea events of 33%2.      Tongue Procedures:  ExamWhile patients are awake, the muscles that surround the throat are active and keep this region open for breathing. These muscles relax during sleep, allowing the tongue and other structures to collapse and block breathing.  There are several different tongue procedures available.  Selection of a tongue base procedure depends on characteristics seen on physical exam.  Generally, procedures are aimed at removing bulky tissues in this area or preventing the back of the tongue from falling back during sleep.  Success rates for tongue surgery range from 50-62%3.    Hypoglossal Nerve Stimulation:  Hypoglossal nerve stimulation has recently received approval from the [place] Food and Drug Administration for the treatment of obstructive sleep apnea.  This is based on research showing that the system was safe and effective in treating sleep apnea6.  Results showed that the median AHI score decreased 68%, from 29.3 to 9.0. This therapy uses an implant system that senses breathing patterns and delivers mild stimulation to airway muscles, which keeps the airway open during sleep.  The system consists of three fully implanted components: a small generator (similar in size to a  pacemaker), a breathing sensor, and a stimulation lead.  Using a small handheld remote, a patient turns the therapy on before bed and off upon awakening.    Candidates for this device must be greater than 22 years of age, have moderate to severe ARIANNA (AHI between 20-65), BMI less than 32, have tried CPAP/oral appliance without success, and have appropriate upper airway anatomy (determined by a sleep endoscopy performed by Dr. Whitehead).    Hypoglossal Nerve Stimulation Pathway:    The sleep surgeon s office will work with the patient through the insurance prior-authorization process (including communications and appeals).    Nasal Procedures:  Nasal obstruction can interfere with nasal breathing during the day and night.  Studies have shown that relief of nasal obstruction can improve the ability of some patients to tolerate positive airway pressure therapy for obstructive sleep apnea1.  Treatment options include medications such as nasal saline, topical corticosteroid and antihistamine sprays, and oral medications such as antihistamines or decongestants. Non-surgical treatments can include external nasal dilators for selected patients. If these are not successful by themselves, surgery can improve the nasal airway either alone or in combination with these other options.      Combination Procedures:  Combination of surgical procedures and other treatments may be recommended, particularly if patients have more than one area of narrowing or persistent positional disease.  The success rate of combination surgery ranges from 66-80%2,3.    References  1. Abel QIU. The Role of the Nose in Snoring and Obstructive Sleep Apnoea: An Update.  Eur Arch Otorhinolaryngol. 2011; 268: 1365-73.  2.  Bere SM; Jaylen JA; Mary Carmen JR; Pallanch JF; Dixie ALCAZAR; Arnold SG; Angelica SANDS. Surgical modifications of the upper airway for obstructive sleep apnea in adults: a systematic review and meta-analysis. SLEEP 2010;33(10):2292-6219. Rohit  E. Hypopharyngeal surgery in obstructive sleep apnea: an evidence-based medicine review.  Arch Otolaryngol Head Neck Surg. 2006 Feb;132(2):206-13.  3. Sunny YHERSON, Chandrakant Y, Shashi MILLY. The efficacy of anatomically based multilevel surgery for obstructive sleep apnea. Otolaryngol Head Neck Surg. 2003 Oct;129(4):327-35.  4. Rohit AHN, Goldberg A. Hypopharyngeal Surgery in Obstructive Sleep Apnea: An Evidence-Based Medicine Review. Arch Otolaryngol Head Neck Surg. 2006 Feb;132(2):206-13.  5. Jasmin PJ et al. Upper-Airway Stimulation for Obstructive Sleep Apnea.  N Engl J Med. 2014 Jan 9;370(2):139-49.  6. César Y et al. Increased Incidence of Cardiovascular Disease in Middle-aged Men with Obstructive Sleep Apnea. Am J Respir Crit Care Med; 2002 166: 159-165  7. Olivia EM et al. Studying Life Effects and Effectiveness of Palatopharyngoplasty (SLEEP) study: Subjective Outcomes of Isolated Uvulopalatopharyngoplasty. Otolaryngol Head Neck Surg. 2011; 144: 623-631.  Stress, tension, and anxiety can be big factors contributing to insomnia.  If you can t relax your mind and body, then you won t be able to sleep.  You would likely benefit from trying some of the relaxation exercises that we ve assembled below.  These are all internet based links.  If you don t have or use a computer, you may benefit from one of the stress management books listed below that you can get at your public library or at a local bookstore for a relatively low price.      Copy and paste into web browser address window   https://www.youVPEPube.com/watch?v=Mzjl3jqKsAp    Progressive Muscle Relaxation (PMR):     http://www.ReadOz/progressive-muscle-relaxation-exercise.html     http://studentsupport.[facility]/counseling/resources/self-help/relaxation-and-stress-management/   Deep Breathing Exercises:    http://www.ROIÂ².Webinar.ru/breathing-awareness.html     Meditation:     www.Mobile Travel Technologiesrantheart.com     www.thePAYMEYguided-meditation-site.com You may have to pay for some of these resources.    Guided Imagery:    http://www.[employer].arcbazar.com/guided-imagery-scripts.html     http://SupportSpace/library/iaxcpehcfo-gkbqkb-qwpjqgn/    [place] site under construction : http://www.[facility]Ohio[facility].org/Services/SleepMedicine/Audio/index.htm    Sometimes insomnia can be made worse by our own habits or situation.  You should consider making some of the following changes to help improve your sleep:     If there is excessive noise in your house / neighborhood use a fan or similar device to make a quiet background noise that can drown out other noises    If your room is too bright early in the morning, hang a blanket or extra curtain over the windows to keep the light out or use a sleeping mask to cover your eyes    If your room is too warm during the night, set the temperature in the house down a few degrees for the night    Spend a little time before bedtime trying to relax.  Don t work right up until bedtime.  Take 30-60 minutes to relax and unwind before bedtime with a book or watching TV    Do not drink anything with alcohol or caffeine in them for at least 4-5 hours before bedtime    Do not smoke right before bedtime or during the night    No strenuous exercise for 2-3 hours before bedtime    A common problem for people with insomnia is spending too much time in bed  trying  to sleep.  You really should only be in bed for no more than 7-8 hours per night.  Spending too much time in bed can lead to being awake and having an  overactive  mind.  One effective way to address this problem is reducing how much time you spend in bed each night.  Be careful with driving or other dangerous activities when trying these strategeis however.  We recommend that you follow these steps to improve your insomnia:    Set a new sleep schedule where you reduce the time you spend in bed by 1-2 hours, e.g. Go to bed at 11 and get up at 6     Keep this sleep schedule every day of the week including the weekends for two weeks.    After two weeks you can add 30-60 minutes more time in bed if you have been sleeping more soundly.    If you still aren t sleeping well, reduce the time you spend in bed by another 30-60 but not less than 5 hours per night.    Please keep a log or diary during this time to track your sleep pattern

## 2018-03-21 DIAGNOSIS — E78.5 HYPERLIPIDEMIA LDL GOAL <130: ICD-10-CM

## 2018-03-21 RX ORDER — ATORVASTATIN CALCIUM 20 MG/1
20 TABLET, FILM COATED ORAL DAILY
Qty: 90 TABLET | Refills: 3 | Status: SHIPPED | OUTPATIENT
Start: 2018-03-21 | End: 2018-12-05

## 2018-03-21 NOTE — TELEPHONE ENCOUNTER
Reason for Call:  Other prescription    Detailed comments: patient is requesting that all of his statin drugs be refilled at [facility] in [place].  He no longer uses [facility].  He was told he had to call to have them transferred.  Please call if there is a problem with doing this.    Phone Number Patient can be reached at: Home number on file [phone] (home)    Best Time: any    Can we leave a detailed message on this number? YES    Call taken on 3/21/2018 at 10:21 AM by Sammy Robledo

## 2018-03-27 ENCOUNTER — TELEPHONE (OUTPATIENT)
Dept: INTERNAL MEDICINE | Facility: CLINIC | Age: 63
End: 2018-03-27

## 2018-03-27 DIAGNOSIS — E78.5 HYPERLIPIDEMIA LDL GOAL <130: Primary | ICD-10-CM

## 2018-03-27 DIAGNOSIS — E78.5 HYPERLIPIDEMIA LDL GOAL <130: ICD-10-CM

## 2018-03-27 LAB
ALBUMIN SERPL-MCNC: 4.4 G/DL (ref 3.4–5)
ALP SERPL-CCNC: 72 U/L (ref 40–150)
ALT SERPL W P-5'-P-CCNC: 45 U/L (ref 0–70)
ANION GAP SERPL CALCULATED.3IONS-SCNC: 7 MMOL/L (ref 3–14)
AST SERPL W P-5'-P-CCNC: 26 U/L (ref 0–45)
BILIRUB SERPL-MCNC: 0.5 MG/DL (ref 0.2–1.3)
BUN SERPL-MCNC: 17 MG/DL (ref 7–30)
CALCIUM SERPL-MCNC: 8.9 MG/DL (ref 8.5–10.1)
CHLORIDE SERPL-SCNC: 104 MMOL/L (ref 94–109)
CHOLEST SERPL-MCNC: 188 MG/DL
CO2 SERPL-SCNC: 30 MMOL/L (ref 20–32)
CREAT SERPL-MCNC: 1.06 MG/DL (ref 0.66–1.25)
GFR SERPL CREATININE-BSD FRML MDRD: 70 ML/MIN/1.7M2
GLUCOSE SERPL-MCNC: 95 MG/DL (ref 70–99)
HDLC SERPL-MCNC: 46 MG/DL
LDLC SERPL CALC-MCNC: 93 MG/DL
NONHDLC SERPL-MCNC: 142 MG/DL
POTASSIUM SERPL-SCNC: 3.9 MMOL/L (ref 3.4–5.3)
PROT SERPL-MCNC: 7.3 G/DL (ref 6.8–8.8)
SODIUM SERPL-SCNC: 141 MMOL/L (ref 133–144)
TRIGL SERPL-MCNC: 244 MG/DL

## 2018-03-27 PROCEDURE — 80053 COMPREHEN METABOLIC PANEL: CPT | Performed by: INTERNAL MEDICINE

## 2018-03-27 PROCEDURE — 80061 LIPID PANEL: CPT | Performed by: INTERNAL MEDICINE

## 2018-03-27 PROCEDURE — 36415 COLL VENOUS BLD VENIPUNCTURE: CPT | Performed by: INTERNAL MEDICINE

## 2018-03-27 NOTE — TELEPHONE ENCOUNTER
Reason for Call: Request for an order or referral:    Order or referral being requested: Patient was seen about 6 weeks ago for a physical. Blood sugar was high and was told to come back in 6 weeks for some FU lab work. There are no orders placed. Please place orders and call patient back to assist with scheduling an appt for him.     Date needed: as soon as possible    Has the patient been seen by the PCP for this problem? YES    Additional comments:     Phone number Patient can be reached at:  Home number on file [phone] (home)    Best Time:  No    Can we leave a detailed message on this number?  YES    Call taken on 3/27/2018 at 8:35 AM by Marlee Ding

## 2018-09-11 ENCOUNTER — OFFICE VISIT (OUTPATIENT)
Dept: INTERNAL MEDICINE | Facility: CLINIC | Age: 63
End: 2018-09-11
Payer: COMMERCIAL

## 2018-09-11 VITALS
DIASTOLIC BLOOD PRESSURE: 86 MMHG | BODY MASS INDEX: 27.41 KG/M2 | SYSTOLIC BLOOD PRESSURE: 136 MMHG | OXYGEN SATURATION: 95 % | HEART RATE: 96 BPM | TEMPERATURE: 98 F | WEIGHT: 200.7 LBS | RESPIRATION RATE: 18 BRPM

## 2018-09-11 DIAGNOSIS — S86.912A KNEE STRAIN, LEFT, INITIAL ENCOUNTER: Primary | ICD-10-CM

## 2018-09-11 PROCEDURE — 99213 OFFICE O/P EST LOW 20 MIN: CPT | Performed by: INTERNAL MEDICINE

## 2018-09-11 ASSESSMENT — PAIN SCALES - GENERAL: PAINLEVEL: MODERATE PAIN (5)

## 2018-09-11 NOTE — PROGRESS NOTES
SUBJECTIVE:   Jaswinder Thompson Sr. is a 63 year old male who presents to clinic today for the following health issues:      Chief Complaint   Patient presents with     Knee Pain     left knee, hurt while jogging at gym, one month     He was running on a treadmill, only a third of a mile.  Happened a month ago.  Left knee is a steady pain.  Inside area is painful.  Tried biofreeze, tens unit and no relief.  Limps a little bit.      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      Facility-Administered Medications Ordered in Other Visits   Medication     bupivacaine 0.5 % -  EPINEPHrine 1:200,000 injection     Physical Exam  /86  Pulse 96  Temp 98  F (36.7  C) (Temporal)  Resp 18  Wt 200 lb 11.2 oz (91 kg)  SpO2 95%  BMI 27.41 kg/m2  General Appearance-healthy, alert, no distress  Left knee is stable, tender on the medial aspect, tender with medial collateral ligament testing, other tests are normal.    ASSESSMENT:  Medial collateral ligament strain on top of possible chronic arthritis. Will treat with rest, ice, 3 times a day, and nsaids daily for 7 days.  If not better in one week then will do a MRI.    Electronically signed by Javon Domínguez MD

## 2018-12-05 DIAGNOSIS — E78.5 HYPERLIPIDEMIA LDL GOAL <130: ICD-10-CM

## 2018-12-05 RX ORDER — ATORVASTATIN CALCIUM 20 MG/1
20 TABLET, FILM COATED ORAL DAILY
Qty: 90 TABLET | Refills: 0 | Status: SHIPPED | OUTPATIENT
Start: 2018-12-05 | End: 2019-09-16

## 2018-12-05 NOTE — TELEPHONE ENCOUNTER
"Requested Prescriptions   Pending Prescriptions Disp Refills     atorvastatin (LIPITOR) 20 MG tablet 90 tablet 3     Sig: Take 1 tablet (20 mg) by mouth daily    Statins Protocol Passed    12/5/2018  8:09 AM       Passed - LDL on file in past 12 months    Recent Labs   Lab Test  03/27/18   1310   LDL  93            Passed - No abnormal creatine kinase in past 12 months    No lab results found.            Passed - Recent (12 mo) or future (30 days) visit within the authorizing provider's specialty    Patient had office visit in the last 12 months or has a visit in the next 30 days with authorizing provider or within the authorizing provider's specialty.  See \"Patient Info\" tab in inbasket, or \"Choose Columns\" in Meds & Orders section of the refill encounter.             Passed - Patient is age 18 or older      Last Written Prescription Date:  3218/18  Last Fill Quantity: 90,  # refills: 3   Last office visit: 9/11/2018 with prescribing provider:     Future Office Visit:      Prescription approved per [facility] Refill Protocol.    Amy Forman RN          "

## 2018-12-12 DIAGNOSIS — E78.5 HYPERLIPIDEMIA LDL GOAL <130: ICD-10-CM

## 2019-04-18 ENCOUNTER — OFFICE VISIT (OUTPATIENT)
Dept: FAMILY MEDICINE | Facility: CLINIC | Age: 64
End: 2019-04-18
Payer: COMMERCIAL

## 2019-04-18 VITALS
SYSTOLIC BLOOD PRESSURE: 130 MMHG | DIASTOLIC BLOOD PRESSURE: 62 MMHG | RESPIRATION RATE: 18 BRPM | HEIGHT: 71 IN | OXYGEN SATURATION: 97 % | BODY MASS INDEX: 29.23 KG/M2 | TEMPERATURE: 97.2 F | WEIGHT: 208.8 LBS | HEART RATE: 100 BPM

## 2019-04-18 DIAGNOSIS — R42 VERTIGO: Primary | ICD-10-CM

## 2019-04-18 PROCEDURE — 99213 OFFICE O/P EST LOW 20 MIN: CPT | Performed by: NURSE PRACTITIONER

## 2019-04-18 RX ORDER — MECLIZINE HYDROCHLORIDE 25 MG/1
25 TABLET ORAL 3 TIMES DAILY PRN
Qty: 20 TABLET | Refills: 0 | Status: SHIPPED | OUTPATIENT
Start: 2019-04-18 | End: 2020-05-27

## 2019-04-18 ASSESSMENT — MIFFLIN-ST. JEOR: SCORE: 1749.24

## 2019-04-18 NOTE — PROGRESS NOTES
SUBJECTIVE:   Jaswinder Thompson Sr. is a 64 year old male who presents to clinic today for the following   health issues:      Dizziness  Onset: few days    Description:   Do you feel faint:  no   Does it feel like the surroundings (bed, room) are moving: YES  Unsteady/off balance: YES  Have you passed out or fallen: no     Intensity: mild, moderate    Progression of Symptoms:  same    Accompanying Signs & Symptoms:  Heart palpitations: no   Nausea, vomiting: no   Weakness in arms or legs: no  Fatigue: YES  Vision or speech changes: no  Ringing in ears (Tinnitus): no   Hearing Loss: no   Headache: Yes    History:   Head trauma/concussion hx: no   Previous similar symptoms: no   Recent bleeding history: no     Precipitating factors:   Worse with activity or head movement: YES  Any new medications (BP?): no   Alcohol/drug abuse/withdrawal: no     Alleviating factors:   Does staying in a fixed position give relief:  YES- little    Therapies Tried and outcome: tylenol helps with headache    Jaswinder is a 64-year-old male seen in clinic today with episodes of mild dizziness.  These episodes are associated with change in position, such as when he initially stands up he will momentarily feel a little off balance, but it quickly clears.  He also notes this when changing from a lying to sitting position, and with ambulation.  He feels at times like the room is spinning.  He has no associated nausea.  This began 3 days ago.  It has not gotten worse, but since it is persistent he thought he should come in and be checked.  He is also had a dull headache, he reports having had some congestion and cold symptoms about a week or 2 ago, but that is cleared.  He presently denies any sinus congestion or nasal discharge.  He denies ear pain, denies diminished hearing.  He does have some tinnitus, but states that is chronic.  He he denies blurred or double vision.  Denies weakness, numbness, or tingling of extremities.  He has never had  "an episode of vertigo in the past.    Additional history: as documented    Reviewed  and updated as needed this visit by clinical staff         Reviewed and updated as needed this visit by Provider         BP Readings from Last 3 Encounters:   04/18/19 130/62   09/11/18 136/86   02/21/18 129/71    Wt Readings from Last 3 Encounters:   04/18/19 94.7 kg (208 lb 12.8 oz)   09/11/18 91 kg (200 lb 11.2 oz)   02/21/18 95.4 kg (210 lb 6.4 oz)                    ROS:  Constitutional, HEENT, cardiovascular, pulmonary, gi and gu systems are negative, except as otherwise noted.    OBJECTIVE:     /62   Pulse 100   Temp 97.2  F (36.2  C) (Temporal)   Resp 18   Ht 1.803 m (5' 11\")   Wt 94.7 kg (208 lb 12.8 oz)   SpO2 97%   BMI 29.12 kg/m    Body mass index is 29.12 kg/m .   GENERAL: healthy, alert and no distress  EYES: Eyes grossly normal to inspection, PERRL and conjunctivae and sclerae normal  HENT: ear canals and TM's normal, nose and mouth without ulcers or lesions  NECK: no adenopathy, no asymmetry, masses, or scars and thyroid normal to palpation  RESP: lungs clear to auscultation - no rales, rhonchi or wheezes  CV: regular rates and rhythm, normal S1 S2, no S3 or S4 and no murmur, click or rub  NEURO: Normal strength and tone, sensory exam grossly normal, mentation intact, speech normal, cranial nerves 2-12 intact, DTR's normal and symmetric , he ambulates without any problem, but did have difficulty maintaining balance doing the heel toe walk,, Romberg normal and rapid alternating movements normal        ASSESSMENT/PLAN:     Problem List Items Addressed This Visit     None      Visit Diagnoses     Vertigo    -  Primary    Relevant Medications    meclizine (ANTIVERT) 25 MG tablet           His ENT and neuro exams are normal.  Dizziness appears minimal.  We did discuss referral to the balance clinic, the patient prefers to try meclizine initially.  He may take 1/2-1 tablet up to 3 times daily as needed.  If " other symptoms develop or dizziness worsens, he will follow-up in clinic.    ROSELYN Wong [facility]

## 2019-04-22 ENCOUNTER — OFFICE VISIT (OUTPATIENT)
Dept: FAMILY MEDICINE | Facility: OTHER | Age: 64
End: 2019-04-22
Payer: COMMERCIAL

## 2019-04-22 VITALS
WEIGHT: 205.8 LBS | HEIGHT: 71 IN | SYSTOLIC BLOOD PRESSURE: 120 MMHG | HEART RATE: 98 BPM | OXYGEN SATURATION: 96 % | RESPIRATION RATE: 18 BRPM | TEMPERATURE: 98.6 F | DIASTOLIC BLOOD PRESSURE: 84 MMHG | BODY MASS INDEX: 28.81 KG/M2

## 2019-04-22 DIAGNOSIS — H81.13 BENIGN PAROXYSMAL POSITIONAL VERTIGO, BILATERAL: Primary | ICD-10-CM

## 2019-04-22 DIAGNOSIS — H83.09 LABYRINTHITIS, UNSPECIFIED LATERALITY: ICD-10-CM

## 2019-04-22 PROCEDURE — 99214 OFFICE O/P EST MOD 30 MIN: CPT | Performed by: PHYSICIAN ASSISTANT

## 2019-04-22 RX ORDER — PREDNISONE 20 MG/1
TABLET ORAL
Qty: 20 TABLET | Refills: 0 | Status: SHIPPED | OUTPATIENT
Start: 2019-04-22 | End: 2019-09-17

## 2019-04-22 ASSESSMENT — PAIN SCALES - GENERAL: PAINLEVEL: MILD PAIN (3)

## 2019-04-22 ASSESSMENT — MIFFLIN-ST. JEOR: SCORE: 1735.63

## 2019-04-22 NOTE — PATIENT INSTRUCTIONS
Patient Education     Managing Balance Problems: Vestibular Rehabilitation Therapy    An inner ear problem can knock out part of the balance system. Vestibular rehabilitation therapy helps you learn how to rely on specific parts of your balance system.  Checking eye movement  The therapist will check for nystagmus. This is an automatic, jumpy eye movement. Nystagmus in certain positions can mean an inner ear problem. It can even show which semicircular canal is affected. The therapist will watch your eyes while you move into different positions.  Treating your condition  Treatment can include:    Canalith repositioning. This is a series of guided head and body movements. It helps move crystals, easing symptoms of benign positional vertigo (BPV).    Habituation exercises to retrain your balance system. The therapist will teach you how to do these exercises at home.    Gaze stabilization exercises. These are to retrain the eyes to stay in focus while your head moves. This helps ease dysequilibrium.    Gait and balance training. This includes standing and walking on different surfaces. The therapist can teach you how to keep your balance and prevent falls.  Doing habituation exercises  The therapist will teach exercises suited to your condition. Habituation exercises will make you dizzy at first. Just remind yourself that symptoms probably will last for only a minute. If you keep doing the exercises, they will help lessen your dizziness. Then when you do a similar movement in daily life, it is less likely to bring on symptoms.  Getting back into action  Dizziness doesn't have to keep you from exercising. Start with activities that don't make you dizzy. Then ease into more challenging activities. Try these tips:    Always exercise with a partner.    Stop if you have nausea or faintness.    Walk on a treadmill, holding on to the handles for support.    Use a ball machine for sports like tennis until you're ready for a  live game. This way you know where to expect the ball.    Don't give up. With time, most people can return to activities and sports.  Date Last Reviewed: 11/1/2016 2000-2018 [employer]. [address] [place] [address], [place] [address]. All rights reserved. This information is not intended as a substitute for professional medical care. Always follow your healthcare professional's instructions.           Patient Education     Dizziness (Vertigo) and Balance Problems: Diagnostic Tests    An otolaryngologist (also called an ENT) is a doctor who specializes in disorders of the ear, nose, and throat. Your ENT can help find clues to the cause of your dizziness. He or she will examine you and go over your health history. Your ENT may also order certain tests to help diagnose your problem.  Hearing testing  In most cases, you will be referred for hearing testing. This is because the nerve that sends balance signals also sends hearing signals. A problem that affects balance can also affect hearing.  Other tests  Your doctor may recommend more than one kind of test. The following tests are painless, but may cause dizziness in some cases.    MRI creates images of the ear or head. A magnetic field and contrast medium are used to make the image.    Electronystagmography (ENG) records eye movement. Small electrodes are put on the skin around your eyes. Then your ear is filled with warm or cold water.    Rotation tests show the relationship between the inner ear and your eyes. You may be asked to wear special goggles or sit in a computerized chair.    Posturography tests your standing balance under different conditions. You will stand on a platform that measures shifts in your body weight.     Electrocochleography (ECoG) measures the fluid pressure in the inner ear. An abnormal ECoG may mean you have Meniere's disease or other conditions.    Vestibular evoked myogenic potentials (VEMPs) may be used if your healthcare  provider suspects a rare condition like superior semicircular canal dehiscence. Electrodes are placed on your neck, and you hear clicks in your ear.  Date Last Reviewed: 11/1/2016 2000-2018 [employer]. [address] [place] [address]. All rights reserved. This information is not intended as a substitute for professional medical care. Always follow your healthcare professional's instructions.           Patient Education     The Inner Ear: Understanding the Balance System    Balance is a group effort of your eyes, inner ear, joints, and muscles. They each send signals to the brain about body position and head movement. The brain uses this information to balance the body. When you have an inner ear problem, the brain may get conflicting signals. This can cause symptoms such as the feeling of spinning (vertigo).  The inner ear sends signals  Inside the inner ear are 3 semicircular canals. Each canal contains tiny hairs, crystals, and fluid. These structures help the canals sense up-and-down, forward and backward, and side-to-side motion. Nerves carry the signals from the canals to the brain.  The brain interprets signals  Signals from throughout your body travel to the brain. Once the signals arrive, the brain decides what they mean. Sometimes signals conflict. Have you ever sat on a stopped train and watched a moving train go by? When that happens, your eyes signal that you're moving. But your inner ear and body signal that you're still. The brain weighs conflicting data such as this and decides what is true. The result is balance.  Date Last Reviewed: 10/1/2016    1311-1347 [employer]. [address] [place] [address]. All rights reserved. This information is not intended as a substitute for professional medical care. Always follow your healthcare professional's instructions.           Patient Education     Benign Paroxysmal Positional Vertigo    Benign paroxysmal  positional vertigo is a common condition. You feel as if the room is spinning after changing position, moving your head quickly, or even just rolling over in bed.  Vertigo is a false feeling of motion plus disorientation that makes it seem as though the room is spinning. A vertigo attack may cause sudden nausea, vomiting, and heavy sweating. Severe vertigo causes a loss of balance. You may even fall down.  Vertigo is caused by a problem with the inner ear. The inner ear is located behind the middle ear. It is a part of the balance center of the body. It contains small calcium particles within fluid-filled canals (semi-circular canals). These particles can move out of position. This may happen as a result of aging, head injury, or disease of the inner ear. Once that happens, moving your head in certain ways may cause the particles to stimulate the inner ear. This creates the feeling of vertigo.  An episode of vertigo may last seconds, minutes, or hours. Once you are over the first episode of vertigo, it may never return. Sometimes symptoms return off and on for several weeks or longer.  Home care  Follow these guidelines when caring for yourself at home:    Rest quietly in bed if your symptoms are severe. Change position slowly. There is usually 1 position that will feel best. This might be lying on 1 side or lying on your back with your head slightly raised on pillows. Until you have no symptoms, you are at a higher risk of falling. Let someone help you when you get up. Get rid of home hazards such as loose electrical cords and throw rugs. Don t walk in unfamiliar areas that are not lighted. Use night lights in bathrooms and kitchen areas.    Do not drive or work with dangerous machinery for 1 week after symptoms go away. This is in case symptoms return suddenly.    Take medicine as prescribed to relieve your symptoms. Unless another medicine was prescribed for nausea, vomiting, and vertigo, you may use  over-the-counter motion sickness medicine. Examples of this include meclizine and dimenhydrinate.  Follow-up care  Follow up with your healthcare provider, or as directed. Tell your provider about any ringing in your ear or hearing loss.  If you had a CT or MRI scan, a specialist will review it. You will be told of any new findings that may affect your care.  When to seek medical advice  Call your healthcare provider right away if any of these occur:    Vertigo gets worse even after taking prescribed medicine    Repeated vomiting even after taking prescribed medicine    Weakness that gets worse    Fainting    Severe headache or unusual drowsiness or confusion    Weakness of an arm or leg or 1 side of the face    Trouble walking    Trouble with speech or vision    Seizure    Trouble hearing    Fever of 100.4 F (38 C) or higher, or as directed by your healthcare provider    Fast heart rate    Chest pain   Date Last Reviewed: 11/1/2017 2000-2018 [employer]. [address] [place] [address], [place] [address]. All rights reserved. This information is not intended as a substitute for professional medical care. Always follow your healthcare professional's instructions.

## 2019-04-22 NOTE — PROGRESS NOTES
SUBJECTIVE:   Jaswinder Thompson Sr. is a 64 year old male who presents to clinic today for the following health issues:      HPI  Dizziness  Onset: 04/16    Description:   Do you feel faint:  no   Does it feel like the surroundings (bed, room) are moving: YES  Unsteady/off balance: YES  Have you passed out or fallen: no     Intensity: moderate    Progression of Symptoms:  worsening    Accompanying Signs & Symptoms:  Heart palpitations: no   Nausea, vomiting: no   Weakness in arms or legs: YES  Fatigue: YES  Vision or speech changes: YES  Ringing in ears (Tinnitus): YES  Hearing Loss: YES    History:   Head trauma/concussion hx: no   Previous similar symptoms: YES  Recent bleeding history: no     Precipitating factors:   Worse with activity or head movement: YES  Any new medications (BP?): no   Alcohol/drug abuse/withdrawal: no     Alleviating factors:   Does staying in a fixed position give relief:  YES    Therapies Tried and outcome: Meclizine did not seem to help  Additional history:     Reviewed and updated as needed this visit by clinical staff  Tobacco  Allergies  Meds  Med Hx  Surg Hx  Fam Hx  Soc Hx        Reviewed and updated as needed this visit by Provider         Patient Active Problem List   Diagnosis     ABNL BLOOD EXAM FINDING OTHER SPEC elev psa     Malignant neoplasm of prostate (H)     HYPERLIPIDEMIA LDL GOAL <130     Encounter for long-term current use of medication     Advanced directives, counseling/discussion     S/P rotator cuff repair left     Past Surgical History:   Procedure Laterality Date     ARTHROSCOPY SHOULDER DECOMPRESSION Left 11/18/2015    Procedure: ARTHROSCOPY SHOULDER DECOMPRESSION;  Surgeon: Arturo Martinez MD;  Location:  OR     ARTHROSCOPY SHOULDER ROTATOR CUFF REPAIR Left 11/18/2015    Procedure: ARTHROSCOPY SHOULDER ROTATOR CUFF REPAIR;  Surgeon: Arturo Martinez MD;  Location:  OR     C REMV PROSTATE,RETROPUB,RADICAL  09/14/2005    Nerve sparing radical  retropubic prostatectomy and pelvic lymph node dissection.  [facility].     COLONOSCOPY  3/28/2011    COMBINED COLONOSCOPY, SINGLE BIOPSY/POLYPECTOMY BY BIOPSY performed by DONAVON GOTTLIEB at  GI     COLONOSCOPY N/A 2016    Procedure: COMBINED COLONOSCOPY, SINGLE OR MULTIPLE BIOPSY/POLYPECTOMY BY BIOPSY;  Surgeon: Alec Mclain MD;  Location:  GI     HC EXCISION BREAST LESION, OPEN >=1  08    Right     HC REMOVE TONSILS/ADENOIDS,<11 Y/O      unsure of age       Social History     Tobacco Use     Smoking status: Former Smoker     Last attempt to quit: 2005     Years since quittin.8     Smokeless tobacco: Never Used   Substance Use Topics     Alcohol use: Yes     Alcohol/week: 0.0 oz     Comment: < one twelve pack beer per week     Family History   Problem Relation Age of Onset     Hypertension Father      Allergies Father         bees     Alzheimer Disease Father      Arthritis Father      Cerebrovascular Disease Mother      Eye Disorder Mother         cataract     Heart Disease Paternal Grandfather      Other Cancer Son         kidney cancer     Breast Cancer Sister      Heart Disease Sister      Cancer Brother      Cardiovascular Brother         Stent placed in his 60's     Cancer Sister         lung cancer         Current Outpatient Medications   Medication Sig Dispense Refill     ASPIRIN 81 MG OR TABS ONE DAILY 100 3     atorvastatin (LIPITOR) 20 MG tablet Take 1 tablet (20 mg) by mouth daily 90 tablet 0     doxylamine (SLEEP AID) 25 MG TABS tablet Take 25 mg by mouth nightly as needed       meclizine (ANTIVERT) 25 MG tablet Take 1 tablet (25 mg) by mouth 3 times daily as needed for dizziness 20 tablet 0     predniSONE (DELTASONE) 20 MG tablet Take 60 mg by mouth daily for 3 days, THEN 40 mg daily for 3 days, THEN 20 mg daily for 3 days, THEN 10 mg daily for 3 days. 20 tablet 0     tadalafil (CIALIS) 20 MG tablet Take 0.5-1 tablets (10-20 mg) by mouth daily as  "needed for erectile dysfunction Never use with nitroglycerin, terazosin or doxazosin. 6 tablet 3     Allergies   Allergen Reactions     Nickel      Patch testing elsewhere per patient report     No Known Drug Allergies      BP Readings from Last 3 Encounters:   04/22/19 120/84   04/18/19 130/62   09/11/18 136/86    Wt Readings from Last 3 Encounters:   04/22/19 93.4 kg (205 lb 12.8 oz)   04/18/19 94.7 kg (208 lb 12.8 oz)   09/11/18 91 kg (200 lb 11.2 oz)                  Labs reviewed in EPIC    ROS:  Constitutional, HEENT, cardiovascular, pulmonary, GI, , musculoskeletal, neuro, skin, endocrine and psych systems are negative, except as otherwise noted.    OBJECTIVE:     /84 (Cuff Size: Adult Regular)   Pulse 98   Temp 98.6  F (37  C) (Temporal)   Resp 18   Ht 1.803 m (5' 11\")   Wt 93.4 kg (205 lb 12.8 oz)   SpO2 96%   BMI 28.70 kg/m    Body mass index is 28.7 kg/m .  GENERAL: healthy, alert and no distress  HENT: ear canals and TM's normal, nose and mouth without ulcers or lesions  NECK: no adenopathy, no asymmetry, masses, or scars and thyroid normal to palpation  RESP: lungs clear to auscultation - no rales, rhonchi or wheezes  CV: regular rate and rhythm, normal S1 S2, no S3 or S4, no murmur, click or rub, no peripheral edema and peripheral pulses strong  ABDOMEN: soft, nontender, no hepatosplenomegaly, no masses and bowel sounds normal  MS: no gross musculoskeletal defects noted, no edema    Diagnostic Test Results:  No results found for this or any previous visit (from the past 24 hour(s)).    ASSESSMENT/PLAN:     1. Benign paroxysmal positional vertigo, bilateral  2. Labyrinthitis, unspecified laterality  Treat as noted and follow-up as directed.  MRI to be considered if headache localizes.  - predniSONE (DELTASONE) 20 MG tablet; Take 60 mg by mouth daily for 3 days, THEN 40 mg daily for 3 days, THEN 20 mg daily for 3 days, THEN 10 mg daily for 3 days.  Dispense: 20 tablet; Refill: 0  - " PHYSICAL THERAPY REFERRAL; Future  - NEUROLOGY ADULT REFERRAL    Jared Noel PA-C  [facility]

## 2019-04-25 ENCOUNTER — TRANSFERRED RECORDS (OUTPATIENT)
Dept: HEALTH INFORMATION MANAGEMENT | Facility: CLINIC | Age: 64
End: 2019-04-25

## 2019-04-25 LAB
ALT SERPL-CCNC: 29 IU/L (ref 8–45)
AST SERPL-CCNC: 18 IU/L (ref 2–40)
CREAT SERPL-MCNC: 1.08 MG/DL (ref 0.72–1.25)
GFR SERPL CREATININE-BSD FRML MDRD: >60 ML/MIN/1.73M2
GLUCOSE SERPL-MCNC: 119 MG/DL (ref 65–100)
POTASSIUM SERPL-SCNC: 3.7 MMOL/L (ref 3.5–5)

## 2019-05-02 ENCOUNTER — TRANSFERRED RECORDS (OUTPATIENT)
Dept: HEALTH INFORMATION MANAGEMENT | Facility: CLINIC | Age: 64
End: 2019-05-02

## 2019-05-06 ENCOUNTER — TRANSFERRED RECORDS (OUTPATIENT)
Dept: HEALTH INFORMATION MANAGEMENT | Facility: CLINIC | Age: 64
End: 2019-05-06

## 2019-06-01 ENCOUNTER — TRANSFERRED RECORDS (OUTPATIENT)
Dept: HEALTH INFORMATION MANAGEMENT | Facility: CLINIC | Age: 64
End: 2019-06-01

## 2019-06-13 LAB — EJECTION FRACTION: 58

## 2019-06-17 ENCOUNTER — TRANSFERRED RECORDS (OUTPATIENT)
Dept: HEALTH INFORMATION MANAGEMENT | Facility: CLINIC | Age: 64
End: 2019-06-17

## 2019-07-04 ENCOUNTER — TRANSFERRED RECORDS (OUTPATIENT)
Dept: HEALTH INFORMATION MANAGEMENT | Facility: CLINIC | Age: 64
End: 2019-07-04

## 2019-07-10 ENCOUNTER — TRANSFERRED RECORDS (OUTPATIENT)
Dept: HEALTH INFORMATION MANAGEMENT | Facility: CLINIC | Age: 64
End: 2019-07-10

## 2019-07-11 LAB
CREAT SERPL-MCNC: 0.69 MG/DL (ref 0.72–1.25)
POTASSIUM SERPL-SCNC: 4.4 MMOL/L (ref 3.5–5.1)

## 2019-07-12 ENCOUNTER — TRANSFERRED RECORDS (OUTPATIENT)
Dept: HEALTH INFORMATION MANAGEMENT | Facility: CLINIC | Age: 64
End: 2019-07-12

## 2019-07-17 ENCOUNTER — TELEPHONE (OUTPATIENT)
Dept: INTERNAL MEDICINE | Facility: CLINIC | Age: 64
End: 2019-07-17

## 2019-07-17 NOTE — TELEPHONE ENCOUNTER
Patient called to schedule an appointment for a hospital follow-up or appeared on a report showing that they were recently discharged from the hospital.    Patient was admitted to [facility] date: 7/18/2019  Reason for hospital admission:  Patient was admitted for Debility.   Does patient have future appointment scheduled with provider? No  Date of future appointment:  Provider is full, message sent to work patient in.      This information will be used to help the care team plan for the patients upcoming visit.  The triage RN may determine that a follow up call is necessary and reach out to the patient via the phone number listed in the chart.     Please route this message on routine priority to the Triage RN pool.

## 2019-07-17 NOTE — TELEPHONE ENCOUNTER
Reason for Call:  Same Day Appointment, Requested Provider:  Javon Domínguez MD    PCP: Javon Domínguez    Reason for visit: Patient is getting discharged from [facility] on 7/18/19 and needs a follow up visit in one week.     Duration of symptoms: n/a    Have you been treated for this in the past? No    Additional comments: not at this time    Can we leave a detailed message on this number? YES    Phone number patient can be reached at: Cell number on file:    Telephone Information:   Mobile [phone]       Best Time: afternoon    Call taken on 7/17/2019 at 5:45 PM by Kristen Escobar

## 2019-07-17 NOTE — TELEPHONE ENCOUNTER
Postponing until 7/19/19 as he will not be discharged until 7/18/19.  Kellie Sutherland, BSN, RN

## 2019-07-18 ENCOUNTER — TRANSFERRED RECORDS (OUTPATIENT)
Dept: HEALTH INFORMATION MANAGEMENT | Facility: CLINIC | Age: 64
End: 2019-07-18

## 2019-07-18 LAB
ALT SERPL-CCNC: 19 U/L (ref 8–45)
AST SERPL-CCNC: 12 U/L (ref 5–41)
CREAT SERPL-MCNC: 0.69 MG/DL (ref 0.72–1.25)
GFR SERPL CREATININE-BSD FRML MDRD: >60 ML/MIN/1.73M2
GLUCOSE SERPL-MCNC: 90 MG/DL (ref 70–105)
POTASSIUM SERPL-SCNC: 4.2 MMOL/L (ref 3.5–5.1)

## 2019-07-19 NOTE — TELEPHONE ENCOUNTER
Pt is scheduled for Wednesday to see Dr. Domínguez.  As of now, pt is still in the hospital, will call pt on Monday to see if he is out of the hospital.

## 2019-07-22 ENCOUNTER — TRANSFERRED RECORDS (OUTPATIENT)
Dept: HEALTH INFORMATION MANAGEMENT | Facility: CLINIC | Age: 64
End: 2019-07-22

## 2019-07-22 NOTE — TELEPHONE ENCOUNTER
RN spoke to Cassi. She states he is still in [facility] and maybe will come home today. They will not need to see Dr Domínguez at this time.  Cassi states Jaswinder is doing well. He has a feeding tube, he is doing PT and [facility]  He is not so confused anymore.  Radha Ness RN

## 2019-07-29 LAB
ALT SERPL-CCNC: 12 U/L (ref 8–45)
AST SERPL-CCNC: 7 U/L (ref 5–41)
CREAT SERPL-MCNC: 0.75 MG/DL (ref 0.72–1.25)
GFR SERPL CREATININE-BSD FRML MDRD: >60 ML/MIN/1.73M(2)
GLUCOSE SERPL-MCNC: 70 MG/DL (ref 70–105)
POTASSIUM SERPL-SCNC: 4.4 MMOL/L (ref 3.5–5.1)

## 2019-08-01 ENCOUNTER — TRANSFERRED RECORDS (OUTPATIENT)
Dept: HEALTH INFORMATION MANAGEMENT | Facility: CLINIC | Age: 64
End: 2019-08-01

## 2019-08-01 LAB
ALT SERPL-CCNC: 13 U/L (ref 8–45)
AST SERPL-CCNC: 13 U/L (ref 5–41)
CREAT SERPL-MCNC: 0.76 MG/DL (ref 0.72–1.25)
GFR SERPL CREATININE-BSD FRML MDRD: >60 ML/MIN/1.73M(2)
GLUCOSE SERPL-MCNC: 88 MG/DL (ref 70–100)
POTASSIUM SERPL-SCNC: 4.6 MMOL/L (ref 3.5–5.1)

## 2019-08-04 LAB
ALT SERPL-CCNC: 37 U/L (ref 8–45)
AST SERPL-CCNC: 19 U/L (ref 5–41)
CREAT SERPL-MCNC: 0.75 MG/DL (ref 0.72–1.25)
GFR SERPL CREATININE-BSD FRML MDRD: >60 ML/MIN/1.73M(2)
GLUCOSE SERPL-MCNC: 77 MG/DL (ref 70–100)
POTASSIUM SERPL-SCNC: 3.6 MMOL/L (ref 3.5–5.1)

## 2019-08-15 ENCOUNTER — TRANSFERRED RECORDS (OUTPATIENT)
Dept: HEALTH INFORMATION MANAGEMENT | Facility: CLINIC | Age: 64
End: 2019-08-15

## 2019-08-15 LAB
CREAT SERPL-MCNC: 1.58 MG/DL (ref 0.72–1.25)
GLUCOSE SERPL-MCNC: 159 MG/DL (ref 70–100)
POTASSIUM SERPL-SCNC: 3.8 MMOL/L (ref 3.5–5.1)

## 2019-08-18 ENCOUNTER — NURSE TRIAGE (OUTPATIENT)
Dept: NURSING | Facility: CLINIC | Age: 64
End: 2019-08-18

## 2019-08-18 NOTE — TELEPHONE ENCOUNTER
[facility] calling requesting to know if patient has upcoming infusion PICC line dressing change appointment. Stating they did not want to discharge patient without a plan or appointment. Reviewed [facility] phone number with caller.   Advised no upcoming appointment per Epic.     Caller will contact [facility] and leave message to follow up with patient.    Izzy Jones RN  [facility]

## 2019-08-19 RX ORDER — HEPARIN SODIUM (PORCINE) LOCK FLUSH IV SOLN 100 UNIT/ML 100 UNIT/ML
5 SOLUTION INTRAVENOUS
Status: CANCELLED | OUTPATIENT
Start: 2019-08-19

## 2019-08-22 ENCOUNTER — INFUSION THERAPY VISIT (OUTPATIENT)
Dept: INFUSION THERAPY | Facility: CLINIC | Age: 64
End: 2019-08-22
Attending: INTERNAL MEDICINE
Payer: COMMERCIAL

## 2019-08-22 DIAGNOSIS — C61 MALIGNANT NEOPLASM OF PROSTATE (H): ICD-10-CM

## 2019-08-22 DIAGNOSIS — Z79.899 ENCOUNTER FOR LONG-TERM CURRENT USE OF MEDICATION: Primary | ICD-10-CM

## 2019-08-22 PROCEDURE — G0463 HOSPITAL OUTPT CLINIC VISIT: HCPCS

## 2019-08-22 RX ORDER — HEPARIN SODIUM (PORCINE) LOCK FLUSH IV SOLN 100 UNIT/ML 100 UNIT/ML
5 SOLUTION INTRAVENOUS
Status: CANCELLED | OUTPATIENT
Start: 2019-08-29

## 2019-08-22 NOTE — PROGRESS NOTES
Infusion Nursing Note:  Jaswinder Thompson Sr. presents today for PICC Line drsg change.    Patient seen by provider today: No   present during visit today: Not Applicable.    Note: N/A.    Intravenous Access:  PICC.    Treatment Conditions:  Not Applicable.      Post Infusion Assessment:  Blood return noted pre and post infusion.  Site patent and intact, free from redness, edema or discomfort.  PICC line dressing change completed.  Tolerated well.       Discharge Plan:   Discharge instructions reviewed with: Patient and Family.  Patient and/or family verbalized understanding of discharge instructions and all questions answered.  Patient discharged in stable condition accompanied by: self and wife.  Departure Mode: Ambulatory.    Karuna Ness, RN, RN

## 2019-08-29 ENCOUNTER — TRANSFERRED RECORDS (OUTPATIENT)
Dept: HEALTH INFORMATION MANAGEMENT | Facility: CLINIC | Age: 64
End: 2019-08-29

## 2019-08-29 LAB
ALT SERPL-CCNC: 14 U/L (ref 8–45)
AST SERPL-CCNC: 14 U/L (ref 5–41)
CREAT SERPL-MCNC: 2.88 MG/DL (ref 0.72–1.25)
GFR SERPL CREATININE-BSD FRML MDRD: 22 ML/MIN/1.73M(2)
GLUCOSE SERPL-MCNC: 110 MG/DL (ref 70–100)
POTASSIUM SERPL-SCNC: 4.6 MMOL/L (ref 3.5–5.1)

## 2019-09-05 ENCOUNTER — INFUSION THERAPY VISIT (OUTPATIENT)
Dept: INFUSION THERAPY | Facility: CLINIC | Age: 64
End: 2019-09-05
Attending: INTERNAL MEDICINE
Payer: COMMERCIAL

## 2019-09-05 DIAGNOSIS — C61 MALIGNANT NEOPLASM OF PROSTATE (H): ICD-10-CM

## 2019-09-05 DIAGNOSIS — Z79.899 ENCOUNTER FOR LONG-TERM CURRENT USE OF MEDICATION: Primary | ICD-10-CM

## 2019-09-05 PROCEDURE — 96523 IRRIG DRUG DELIVERY DEVICE: CPT

## 2019-09-05 RX ORDER — HEPARIN SODIUM (PORCINE) LOCK FLUSH IV SOLN 100 UNIT/ML 100 UNIT/ML
5 SOLUTION INTRAVENOUS
Status: CANCELLED | OUTPATIENT
Start: 2019-09-12

## 2019-09-05 NOTE — PROGRESS NOTES
Infusion Nursing Note:  Jaswinder Thompson Sr. presents today for PICC line drsg change. .    Patient seen by provider today: No   present during visit today: Not Applicable.    Note: pt states that he may be having port placed next week in [facility].    Intravenous Access:  PICC.    Treatment Conditions:  Not Applicable.      Post Infusion Assessment:  Patient tolerated dressing change without incident.  Blood return noted pre and post infusion.  Site patent and intact, free from redness, edema or discomfort.       Discharge Plan:   Discharge instructions reviewed with: Patient and Family.  Patient discharged in stable condition accompanied by: wife.  Departure Mode: Ambulatory.    Ronda Morris, RN, RN

## 2019-09-11 ENCOUNTER — TRANSFERRED RECORDS (OUTPATIENT)
Dept: HEALTH INFORMATION MANAGEMENT | Facility: CLINIC | Age: 64
End: 2019-09-11

## 2019-09-12 ENCOUNTER — TRANSFERRED RECORDS (OUTPATIENT)
Dept: HEALTH INFORMATION MANAGEMENT | Facility: CLINIC | Age: 64
End: 2019-09-12

## 2019-09-13 DIAGNOSIS — I10 ESSENTIAL HYPERTENSION: ICD-10-CM

## 2019-09-13 DIAGNOSIS — E78.5 HYPERLIPIDEMIA LDL GOAL <130: Primary | ICD-10-CM

## 2019-09-13 DIAGNOSIS — C83.390 PRIMARY CNS LYMPHOMA: ICD-10-CM

## 2019-09-13 NOTE — TELEPHONE ENCOUNTER
"Requested Prescriptions   Pending Prescriptions Disp Refills     atorvastatin (LIPITOR) 20 MG tablet 90 tablet 0     Sig: Take 1 tablet (20 mg) by mouth daily   Last Written Prescription Date:  12/5/2018  Last Fill Quantity: 90,  # refills: 0   Last office visit: 4/22/2019   Future Office Visit:        Statins Protocol Failed - 9/13/2019  9:17 AM        Failed - LDL on file in past 12 months     Recent Labs   Lab Test 03/27/18  1310   LDL 93           Passed - No abnormal creatine kinase in past 12 months     No lab results found.           Passed - Recent (12 mo) or future (30 days) visit within the authorizing provider's specialty     Patient had office visit in the last 12 months or has a visit in the next 30 days with authorizing provider or within the authorizing provider's specialty.  See \"Patient Info\" tab in inbasket, or \"Choose Columns\" in Meds & Orders section of the refill encounter.          Passed - Medication is active on med list        Passed - Patient is age 18 or older   Routing refill request to provider for review/approval          OXcarbazepine (TRILEPTAL) 150 MG tablet 180 tablet 0     Sig: Take 3 tablets by twice daily       There is no refill protocol information for this order   Routing refill request to provider for review/approval           QUEtiapine (SEROQUEL) 200 MG tablet 30 tablet 0     Sig: Take 1 tablet (200 mg) by mouth At Bedtime   Last Written Prescription Date:  NA  Last Fill Quantity: NA,  # refills: NA   Last office visit: 9/11/2018 with prescribing provider:     Future Office Visit:        Antipsychotic Medications Failed - 9/13/2019  9:17 AM        Failed - Lipid panel on file within the past 12 months     Recent Labs   Lab Test 03/27/18  1310  03/30/15  1526   CHOL 188   < > 187   TRIG 244*   < > 206*   HDL 46   < > 54   LDL 93   < > 92   NHDL 142*   < >  --    VLDL  --   --  41*   CHOLHDLRATIO  --   --  3.5    < > = values in this interval not displayed.           Failed - " "CBC on file in past 12 months     Recent Labs   Lab Test 04/01/16  1523   WBC 6.4   RBC 4.77   HGB 14.4   HCT 42.3              Failed - Medication is active on med list        Passed - Blood pressure under 140/90 in past 12 months     BP Readings from Last 3 Encounters:   04/22/19 120/84   04/18/19 130/62   09/11/18 136/86           Passed - Patient is 12 years of age or older        Passed - Heart Rate on file within past 12 months     Pulse Readings from Last 3 Encounters:   04/22/19 98   04/18/19 100   09/11/18 96           Passed - A1c or Glucose on file in past 12 months     Recent Labs   Lab Test 08/15/19   *     Please review patients last 3 weights. If a weight gain of >10 lbs exists, you may refill the prescription once after instructing the patient to schedule an appointment within the next 30 days.    Wt Readings from Last 3 Encounters:   04/22/19 93.4 kg (205 lb 12.8 oz)   04/18/19 94.7 kg (208 lb 12.8 oz)   09/11/18 91 kg (200 lb 11.2 oz)           Passed - Recent (6 mo) or future (30 days) visit within the authorizing provider's specialty     Patient had office visit in the last 6 months or has a visit in the next 30 days with authorizing provider or within the authorizing provider's specialty.  See \"Patient Info\" tab in inbasket, or \"Choose Columns\" in Meds & Orders section of the refill encounter.       Routing refill request to provider for review/approval       clopidogrel (PLAVIX) 75 MG tablet 30 tablet 0     Sig: Take 1 tablet (75 mg) by mouth daily       Plavix Failed - 9/13/2019  9:17 AM        Failed - Normal HGB on file in past 12 months     Recent Labs   Lab Test 04/01/16  1523   HGB 14.4           Failed - Normal Platelets on file in past 12 months     Recent Labs   Lab Test 04/01/16  1523              Failed - Medication is active on med list        Passed - No active PPI on record unless is Protonix        Passed - Recent (12 mo) or future (30 days) visit within the " "authorizing provider's specialty     Patient had office visit in the last 12 months or has a visit in the next 30 days with authorizing provider or within the authorizing provider's specialty.  See \"Patient Info\" tab in inbasket, or \"Choose Columns\" in Meds & Orders section of the refill encounter.          Passed - Patient is age 18 or older        metoprolol tartrate (LOPRESSOR) 25 MG tablet 60 tablet 0     Sig: Take 1 tablet (25 mg) by mouth 2 times daily   Last Written Prescription Date:  NA  Last Fill Quantity: NA,  # refills: NA   Last office visit: 9/11/2018 with prescribing provider:     Future Office Visit:        Beta-Blockers Protocol Failed - 9/13/2019  9:17 AM        Failed - Medication is active on med list        Passed - Blood pressure under 140/90 in past 12 months     BP Readings from Last 3 Encounters:   04/22/19 120/84   04/18/19 130/62   09/11/18 136/86           Passed - Patient is age 6 or older        Passed - Recent (12 mo) or future (30 days) visit within the authorizing provider's specialty     Patient had office visit in the last 12 months or has a visit in the next 30 days with authorizing provider or within the authorizing provider's specialty.  See \"Patient Info\" tab in inbasket, or \"Choose Columns\" in Meds & Orders section of the refill encounter.        Routing refill request to provider for review/approval   Amy Forman RN      "

## 2019-09-13 NOTE — TELEPHONE ENCOUNTER
Pending Prescriptions Disp Refills     atorvastatin (LIPITOR) 20 MG tablet 90 tablet 0     Sig: Take 1 tablet (20 mg) by mouth daily   Routing refill request to provider for review/approval because:  Labs not current:  LDL  Break in medication last filled 12/5/2018 for 3 months  T'd up 1 month for provider review.      OXcarbazepine (TRILEPTAL) 150 MG tablet 180 tablet 0    Sig: Take 3 tablets by twice daily      There is no refill protocol information for this order   Last Written Prescription Date:  NA  Last Fill Quantity: NA,  # refills: NA   Last office visit: 9/11/2018 with prescribing provider:  RGEYSON   Future Office Visit:      Routing refill request to provider for review/approval because:  Drug not on the [facility] refill protocol   Drug not active on patient's medication list      QUEtiapine (SEROQUEL) 200 MG tablet 30 tablet 0    Sig: Take 1 tablet (200 mg) by mouth At Bedtime   Routing refill request to provider for review/approval because:  Drug not active on patient's medication list      clopidogrel (PLAVIX) 75 MG tablet 30 tablet 0    Sig: Take 1 tablet (75 mg) by mouth daily   Routing refill request to provider for review/approval because:  Drug not active on patient's medication list        metoprolol tartrate (LOPRESSOR) 25 MG tablet 60 tablet 0    Sig: Take 1 tablet (25 mg) by mouth 2 times daily   Routing refill request to provider for review/approval because:  Drug not active on patient's medication list      Amy Forman RN

## 2019-09-13 NOTE — TELEPHONE ENCOUNTER
Please check with him or family. I haven't seen him recently should get refills from doctors that are following, oncology.

## 2019-09-16 ENCOUNTER — TELEPHONE (OUTPATIENT)
Dept: INTERNAL MEDICINE | Facility: CLINIC | Age: 64
End: 2019-09-16

## 2019-09-16 NOTE — TELEPHONE ENCOUNTER
Patient called to schedule an appointment for a hospital follow-up or appeared on a report showing that they were recently discharged from the hospital.    Patient was admitted to [facility]:  09/12  Discharged date: 09/16  Reason for hospital admission:  Planned Chemotherapy  Does patient have future appointment scheduled with provider? Yes Dr Domínguez  Date of future appointment:  10/01      This information will be used to help the care team plan for the patients upcoming visit.  The triage RN may determine that a follow up call is necessary and reach out to the patient via the phone number listed in the chart.     Please route this message on routine priority to the Triage RN pool.

## 2019-09-17 RX ORDER — TEMOZOLOMIDE 100 MG/1
400 CAPSULE ORAL DAILY
COMMUNITY
Start: 2019-08-19 | End: 2020-05-27

## 2019-09-17 RX ORDER — POLYETHYLENE GLYCOL 3350 17 G/17G
17 POWDER, FOR SOLUTION ORAL DAILY PRN
COMMUNITY
Start: 2019-08-04 | End: 2020-06-11

## 2019-09-17 RX ORDER — QUETIAPINE FUMARATE 100 MG/1
100 TABLET, FILM COATED ORAL DAILY
COMMUNITY
Start: 2019-09-16 | End: 2019-10-14

## 2019-09-17 RX ORDER — SODIUM BICARBONATE 650 MG/1
4 TABLET ORAL EVERY 4 HOURS PRN
COMMUNITY
Start: 2019-08-15 | End: 2020-05-27

## 2019-09-17 RX ORDER — METOPROLOL TARTRATE 25 MG/1
25 TABLET, FILM COATED ORAL 2 TIMES DAILY
Qty: 60 TABLET | Refills: 0 | Status: SHIPPED | OUTPATIENT
Start: 2019-09-17 | End: 2019-10-11

## 2019-09-17 RX ORDER — CLOPIDOGREL BISULFATE 75 MG/1
75 TABLET ORAL DAILY
Qty: 30 TABLET | Refills: 0 | Status: SHIPPED | OUTPATIENT
Start: 2019-09-17 | End: 2019-11-27

## 2019-09-17 RX ORDER — ACETAMINOPHEN 325 MG/1
650 TABLET ORAL EVERY 4 HOURS PRN
Refills: 0 | COMMUNITY
Start: 2019-09-03 | End: 2020-05-27

## 2019-09-17 RX ORDER — OXCARBAZEPINE 150 MG/1
TABLET, FILM COATED ORAL
Qty: 180 TABLET | Refills: 0 | Status: SHIPPED | OUTPATIENT
Start: 2019-09-17 | End: 2019-09-17

## 2019-09-17 RX ORDER — ATORVASTATIN CALCIUM 20 MG/1
20 TABLET, FILM COATED ORAL DAILY
COMMUNITY
Start: 2019-08-04 | End: 2019-10-04

## 2019-09-17 RX ORDER — QUETIAPINE FUMARATE 200 MG/1
200 TABLET, FILM COATED ORAL AT BEDTIME
Qty: 30 TABLET | Refills: 0 | Status: SHIPPED | OUTPATIENT
Start: 2019-09-17 | End: 2019-11-27

## 2019-09-17 RX ORDER — ASPIRIN 81 MG
1 TABLET, DELAYED RELEASE (ENTERIC COATED) ORAL DAILY
COMMUNITY
Start: 2019-08-04 | End: 2021-09-28

## 2019-09-17 RX ORDER — OXCARBAZEPINE 300 MG/1
450 TABLET, FILM COATED ORAL 2 TIMES DAILY
COMMUNITY
Start: 2019-09-16 | End: 2020-05-27

## 2019-09-17 RX ORDER — ATORVASTATIN CALCIUM 20 MG/1
20 TABLET, FILM COATED ORAL DAILY
Qty: 30 TABLET | Refills: 0 | Status: SHIPPED | OUTPATIENT
Start: 2019-09-17 | End: 2019-10-25

## 2019-09-17 NOTE — TELEPHONE ENCOUNTER
"Hospital/TCU/ED for chronic condition Discharge Protocol    \"Hi, my name is Radha Ness RN, a registered nurse, and I am calling from [facility].  I am calling to follow up and see how things are going for you after your recent emergency visit/hospital/TCU stay.\"    Tell me how you are doing now that you are home?\" RN spoke to Marcelo's wife. She manages his meds and helps with his health care.      Discharge Instructions    \"Let's review your discharge instructions.  What is/are the follow-up recommendations?  Pt. Response: We go to chemo every two weeks. They admit him and he stays until his methotrexate level is good.    \"Has an appointment with your primary care provider been scheduled?\"   Yes. (confirm)    \"When you see the provider, I would recommend that you bring your medications with you.\"    Medications    \"Tell me what changed about your medicines when you discharged?\"    Changes to chronic meds?    0-1    \"What questions do you have about your medications?\"    None     New diagnoses of heart failure, COPD, diabetes, or MI?    No              Post Discharge Medication Reconciliation Status: discharge medications reconciled and changed, per note/orders (see AVS).    Was MTM referral placed (*Make sure to put transitions as reason for referral)?   No    Call Summary    \"What questions or concerns do you have about your recent visit and your follow-up care?\"     none    \"If you have questions or things don't continue to improve, we encourage you contact us through the main clinic number (give number).  Even if the clinic is not open, triage nurses are available 24/7 to help you.     We would like you to know that our clinic has extended hours (provide information).  We also have urgent care (provide details on closest location and hours/contact info)\"      \"Thank you for your time and take care!\"             "

## 2019-09-26 ENCOUNTER — TRANSFERRED RECORDS (OUTPATIENT)
Dept: HEALTH INFORMATION MANAGEMENT | Facility: CLINIC | Age: 64
End: 2019-09-26

## 2019-10-04 ENCOUNTER — OFFICE VISIT (OUTPATIENT)
Dept: INTERNAL MEDICINE | Facility: CLINIC | Age: 64
End: 2019-10-04
Payer: COMMERCIAL

## 2019-10-04 VITALS
DIASTOLIC BLOOD PRESSURE: 66 MMHG | WEIGHT: 194 LBS | TEMPERATURE: 96.3 F | SYSTOLIC BLOOD PRESSURE: 104 MMHG | RESPIRATION RATE: 16 BRPM | HEART RATE: 76 BPM | BODY MASS INDEX: 27.06 KG/M2 | OXYGEN SATURATION: 98 %

## 2019-10-04 DIAGNOSIS — I10 ESSENTIAL HYPERTENSION: ICD-10-CM

## 2019-10-04 DIAGNOSIS — C83.390 PRIMARY CNS LYMPHOMA: Primary | ICD-10-CM

## 2019-10-04 DIAGNOSIS — E78.5 HYPERLIPIDEMIA LDL GOAL <130: ICD-10-CM

## 2019-10-04 PROCEDURE — 99495 TRANSJ CARE MGMT MOD F2F 14D: CPT | Performed by: INTERNAL MEDICINE

## 2019-10-04 RX ORDER — DIPHENHYDRAMINE HCL 50 MG
50 CAPSULE ORAL
COMMUNITY
End: 2020-05-27

## 2019-10-04 RX ORDER — LANOLIN ALCOHOL/MO/W.PET/CERES
1 CREAM (GRAM) TOPICAL
COMMUNITY
End: 2021-09-28 | Stop reason: ALTCHOICE

## 2019-10-04 ASSESSMENT — PAIN SCALES - GENERAL: PAINLEVEL: NO PAIN (0)

## 2019-10-04 NOTE — PROGRESS NOTES
Subjective     Jaswinder Thompson Sr. is a 64 year old male who presents to clinic today for the following health issues:    HPI     Hospital Follow-up Visit:    Hospital/Nursing Home/IP Rehab Facility: [facility]  Date of Admission: 9/26/19  Date of Discharge: 10/3/19  Reason(s) for Admission: CNS lymphoma            Problems taking medications regularly:  None       Medication changes since discharge: None       Problems adhering to non-medication therapy:  None    Summary of hospitalization:  CareEverywhere information obtained and reviewed  Diagnostic Tests/Treatments reviewed.  Follow up needed: none  Other Healthcare Providers Involved in Patient s Care:         None  Update since discharge: improved.     Post Discharge Medication Reconciliation: discharge medications reconciled and changed, per note/orders (see AVS).  Plan of care communicated with patient       CNS lymphoma and getting chemotherapy from [facility]. CT and MRI showed improvement so now going every 28 days. Lives with his wife in [place].  Ct chest, abd, pelvis was clear.     Feeling good he says.      Had chemotherapy a week ago.  Counts changed so he got out two days ago.      No bleeding problems, no nausea, no vomiting.  Sleeping ok.  No pains.      Previous PE but no anticoagulation due to GI bleed, filter was removed.      Plavix and aspirin still with stroke history.        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 - see neuro report 9/06     Tubular adenoma of colon        Current Outpatient Medications   Medication     ASPIRIN 81 MG OR TABS     atorvastatin (LIPITOR) 20 MG tablet     calcium-vitamin D (OSCAL) 250-125 MG-UNIT TABS per tablet     clopidogrel (PLAVIX) 75  MG tablet     diphenhydrAMINE (BENADRYL) 50 MG capsule     MAPAP 325 MG tablet     meclizine (ANTIVERT) 25 MG tablet     melatonin 3 MG tablet     metoprolol tartrate (LOPRESSOR) 25 MG tablet     multivitamin, therapeutic with minerals (THERA-VIT-M) TABS tablet     OXcarbazepine (TRILEPTAL) 300 MG tablet     polyethylene glycol (MIRALAX/GLYCOLAX) packet     QUEtiapine (SEROQUEL) 100 MG tablet     QUEtiapine (SEROQUEL) 200 MG tablet     ranitidine (ZANTAC) 150 MG tablet     sodium bicarbonate 650 MG tablet     temozolomide (TEMODAR) 100 MG capsule CHEMO     Thiamine HCl 250 MG TABS     No current facility-administered medications for this visit.        Social History     Tobacco Use     Smoking status: Former Smoker     Last attempt to quit: 2005     Years since quittin.2     Smokeless tobacco: Never Used   Substance Use Topics     Alcohol use: Yes     Alcohol/week: 0.0 standard drinks     Comment: < one twelve pack beer per week     Drug use: No       Review of Systems  Constitutional-No fevers, chills, or weight changes..  Cardiac-No chest pain or palpitations.  Respiratory-No cough, sob, or hemoptysis.  GI-No nausea, vomitting, diarrhea, constipation, or blood in the stool.  Musculoskeletal-No muscles aches or joint pains.    Physical Exam  Vitals: /66   Pulse 76   Temp 96.3  F (35.7  C) (Temporal)   Resp 16   Wt 88 kg (194 lb)   SpO2 98%   BMI 27.06 kg/m    BMI= Body mass index is 27.06 kg/m .    General Appearance-healthy, alert, no distress  Cardiac-regular rate and rhythm  with normal S1, S2 ; no murmur, rub or gallops  Lungs-clear to auscultation  Extremities-no peripheral edema, peripheral pulses normal    ASSESSMENT:  64-year-old gentleman who came in in the spring to the emergency room with confusion.  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  He has been getting chemotherapy through [facility] with Dr. JAC Bazan.  The patient is done very well he has  been in the hospital multiple times and I have not seen him for basically a year.  But now his confusion is improved, he is mentating appropriately.    We reviewed his imaging and the MRI did not show any lymphoma in his CT scan of his chest abdomen pelvis was clear as well.  The patient is now on chemotherapy every 28 days and he will be going back at the end of October for another round.  We discussed his flu shot and then it should talk to his oncologist about the timing but I would suspect he should do that about a week before his next infusion.  The patient does have a port for his infusions and is having no problems.    He has a history of a stroke and continues on aspirin and Plavix and Lipitor.    For his mood he is on Seroquel and that seems to be helping him.  His wife is not along for the visit today.    Patient can follow-up with myself whenever he has an acute medical need that is not related to his cancer.  Electronically signed by Javon Domínguez MD

## 2019-10-11 DIAGNOSIS — I10 ESSENTIAL HYPERTENSION: ICD-10-CM

## 2019-10-11 RX ORDER — METOPROLOL TARTRATE 25 MG/1
TABLET, FILM COATED ORAL
Qty: 60 TABLET | Refills: 8 | Status: SHIPPED | OUTPATIENT
Start: 2019-10-11 | End: 2020-06-11

## 2019-10-11 NOTE — TELEPHONE ENCOUNTER
"  Requested Prescriptions   Pending Prescriptions Disp Refills     metoprolol tartrate (LOPRESSOR) 25 MG tablet [Pharmacy Med Name: METOPROL TAR 25MG   TAB] 60 tablet 0     Sig: TAKE 1 TABLET BY MOUTH TWICE DAILY   Last Written Prescription Date:  9/174/2019  Last Fill Quantity: 60,  # refills: 0   Last office visit: 10/4/2019 with prescribing provider:     Future Office Visit:        Beta-Blockers Protocol Passed - 10/11/2019  9:55 AM        Passed - Blood pressure under 140/90 in past 12 months     BP Readings from Last 3 Encounters:   10/04/19 104/66   04/22/19 120/84   04/18/19 130/62           Passed - Patient is age 6 or older        Passed - Recent (12 mo) or future (30 days) visit within the authorizing provider's specialty     Patient has had an office visit with the authorizing provider or a provider within the authorizing providers department within the previous 12 mos or has a future within next 30 days. See \"Patient Info\" tab in inbasket, or \"Choose Columns\" in Meds & Orders section of the refill encounter.              Passed - Medication is active on med list      Prescription approved per [facility] Refill Protocol.  Amy Forman RN      "

## 2019-10-14 DIAGNOSIS — C83.390 PRIMARY CNS LYMPHOMA: Primary | ICD-10-CM

## 2019-10-15 RX ORDER — QUETIAPINE FUMARATE 100 MG/1
100 TABLET, FILM COATED ORAL DAILY
Qty: 30 TABLET | Refills: 1 | Status: SHIPPED | OUTPATIENT
Start: 2019-10-15 | End: 2020-01-06

## 2019-10-15 NOTE — TELEPHONE ENCOUNTER
Routing refill request to provider for review/approval because:  Labs not current:  FLP, CBC    Seroquel  Last Written Prescription Date:  9/17/2019  Last Fill Quantity: 30,  # refills: 0   Last office visit: 10/4/2019 with prescribing provider:  Sang   Future Office Visit:      Requested Prescriptions   Pending Prescriptions Disp Refills     QUEtiapine (SEROQUEL) 100 MG tablet 30 tablet 1     Sig: Take 1 tablet (100 mg) by mouth daily       Antipsychotic Medications Failed - 10/14/2019 12:45 PM        Failed - Lipid panel on file within the past 12 months     Recent Labs   Lab Test 03/27/18  1310  03/30/15  1526   CHOL 188   < > 187   TRIG 244*   < > 206*   HDL 46   < > 54   LDL 93   < > 92   NHDL 142*   < >  --    VLDL  --   --  41*   CHOLHDLRATIO  --   --  3.5    < > = values in this interval not displayed.               Failed - CBC on file in past 12 months     Recent Labs   Lab Test 04/01/16  1523   WBC 6.4   RBC 4.77   HGB 14.4   HCT 42.3                    Passed - Blood pressure under 140/90 in past 12 months     BP Readings from Last 3 Encounters:   10/04/19 104/66   04/22/19 120/84   04/18/19 130/62                 Passed - Patient is 12 years of age or older        Passed - Heart Rate on file within past 12 months     Pulse Readings from Last 3 Encounters:   10/04/19 76   04/22/19 98   04/18/19 100               Passed - A1c or Glucose on file in past 12 months     Recent Labs   Lab Test 08/29/19   *       Please review patients last 3 weights. If a weight gain of >10 lbs exists, you may refill the prescription once after instructing the patient to schedule an appointment within the next 30 days.    Wt Readings from Last 3 Encounters:   10/04/19 88 kg (194 lb)   04/22/19 93.4 kg (205 lb 12.8 oz)   04/18/19 94.7 kg (208 lb 12.8 oz)             Passed - Medication is active on med list        Passed - Recent (6 mo) or future (30 days) visit within the authorizing provider's specialty      "Patient had office visit in the last 6 months or has a visit in the next 30 days with authorizing provider or within the authorizing provider's specialty.  See \"Patient Info\" tab in inbasket, or \"Choose Columns\" in Meds & Orders section of the refill encounter.            Iris Tillman RN on 10/15/2019 at 9:55 AM    "

## 2019-10-24 ENCOUNTER — TRANSFERRED RECORDS (OUTPATIENT)
Dept: HEALTH INFORMATION MANAGEMENT | Facility: CLINIC | Age: 64
End: 2019-10-24

## 2019-10-24 LAB
CREAT SERPL-MCNC: 1.22 MG/DL (ref 0.72–1.25)
GLUCOSE SERPL-MCNC: 138 MG/DL (ref 70–100)
POTASSIUM SERPL-SCNC: 4.3 MMOL/L (ref 3.5–5.1)

## 2019-10-25 DIAGNOSIS — E78.5 HYPERLIPIDEMIA LDL GOAL <130: ICD-10-CM

## 2019-10-25 RX ORDER — ATORVASTATIN CALCIUM 20 MG/1
TABLET, FILM COATED ORAL
Qty: 90 TABLET | Refills: 0 | Status: SHIPPED | OUTPATIENT
Start: 2019-10-25 | End: 2019-12-31

## 2019-10-25 NOTE — TELEPHONE ENCOUNTER
"Atorvastatin  Last Written Prescription Date:  09/17/2019  Last Fill Quantity: 30,  # refills: 0   Last office visit: 10/4/2019 with prescribing provider:     Future Office Visit:      Requested Prescriptions   Pending Prescriptions Disp Refills     atorvastatin (LIPITOR) 20 MG tablet [Pharmacy Med Name: ATORVASTATIN CALCIUM 20 MG Tablet] 90 tablet 0     Sig: TAKE 1 TABLET EVERY DAY       Statins Protocol Failed - 10/25/2019  3:28 PM        Failed - LDL on file in past 12 months     Recent Labs   Lab Test 03/27/18  1310   LDL 93             Passed - No abnormal creatine kinase in past 12 months     No lab results found.             Passed - Recent (12 mo) or future (30 days) visit within the authorizing provider's specialty     Patient has had an office visit with the authorizing provider or a provider within the authorizing providers department within the previous 12 mos or has a future within next 30 days. See \"Patient Info\" tab in inbasket, or \"Choose Columns\" in Meds & Orders section of the refill encounter.              Passed - Medication is active on med list        Passed - Patient is age 18 or older        "

## 2019-10-26 ENCOUNTER — HEALTH MAINTENANCE LETTER (OUTPATIENT)
Age: 64
End: 2019-10-26

## 2019-10-29 LAB
ALT SERPL-CCNC: 210 U/L (ref 8–45)
AST SERPL-CCNC: 47 U/L (ref 5–41)
CREAT SERPL-MCNC: 1.04 MG/DL (ref 0.72–1.25)
GFR SERPL CREATININE-BSD FRML MDRD: >60 ML/MIN/1.73M2
GLUCOSE SERPL-MCNC: 94 MG/DL (ref 70–100)
POTASSIUM SERPL-SCNC: 3.9 MMOL/L (ref 3.5–5.1)

## 2019-11-21 ENCOUNTER — TRANSFERRED RECORDS (OUTPATIENT)
Dept: HEALTH INFORMATION MANAGEMENT | Facility: CLINIC | Age: 64
End: 2019-11-21

## 2019-11-27 DIAGNOSIS — I10 ESSENTIAL HYPERTENSION: ICD-10-CM

## 2019-11-27 DIAGNOSIS — C83.390 PRIMARY CNS LYMPHOMA: ICD-10-CM

## 2019-11-27 RX ORDER — CLOPIDOGREL BISULFATE 75 MG/1
TABLET ORAL
Qty: 30 TABLET | Refills: 0 | Status: SHIPPED | OUTPATIENT
Start: 2019-11-27 | End: 2020-01-06

## 2019-11-27 RX ORDER — QUETIAPINE FUMARATE 200 MG/1
TABLET, FILM COATED ORAL
Qty: 30 TABLET | Refills: 0 | Status: SHIPPED | OUTPATIENT
Start: 2019-11-27 | End: 2020-01-06

## 2019-11-27 RX ORDER — OXCARBAZEPINE 150 MG/1
TABLET, FILM COATED ORAL
Qty: 180 TABLET | Refills: 0 | Status: SHIPPED | OUTPATIENT
Start: 2019-11-27 | End: 2020-01-06

## 2019-11-27 NOTE — TELEPHONE ENCOUNTER
Robertson wife calls asking if Dr Domínguez would approve these medications today or Friday because Jaswinder is almost out and he cannot go without them.

## 2019-12-19 ENCOUNTER — TRANSFERRED RECORDS (OUTPATIENT)
Dept: HEALTH INFORMATION MANAGEMENT | Facility: CLINIC | Age: 64
End: 2019-12-19

## 2019-12-30 DIAGNOSIS — E78.5 HYPERLIPIDEMIA LDL GOAL <130: ICD-10-CM

## 2019-12-31 RX ORDER — ATORVASTATIN CALCIUM 20 MG/1
TABLET, FILM COATED ORAL
Qty: 90 TABLET | Refills: 0 | Status: SHIPPED | OUTPATIENT
Start: 2019-12-31 | End: 2020-03-16

## 2019-12-31 NOTE — TELEPHONE ENCOUNTER
"lipitor  Last Written Prescription Date:  10/25/2019  Last Fill Quantity: 90,  # refills: 0   Last office visit: 10/4/2019 with prescribing provider:     Future Office Visit:    Requested Prescriptions   Pending Prescriptions Disp Refills     atorvastatin (LIPITOR) 20 MG tablet [Pharmacy Med Name: ATORVASTATIN CALCIUM 20 MG Tablet] 90 tablet 0     Sig: TAKE 1 TABLET EVERY DAY       Statins Protocol Failed - 12/30/2019  4:51 PM        Failed - LDL on file in past 12 months     Recent Labs   Lab Test 03/27/18  1310   LDL 93             Passed - No abnormal creatine kinase in past 12 months     No lab results found.             Passed - Recent (12 mo) or future (30 days) visit within the authorizing provider's specialty     Patient has had an office visit with the authorizing provider or a provider within the authorizing providers department within the previous 12 mos or has a future within next 30 days. See \"Patient Info\" tab in inbasket, or \"Choose Columns\" in Meds & Orders section of the refill encounter.              Passed - Medication is active on med list        Passed - Patient is age 18 or older        "

## 2020-01-02 DIAGNOSIS — I10 ESSENTIAL HYPERTENSION: ICD-10-CM

## 2020-01-02 DIAGNOSIS — C83.390 PRIMARY CNS LYMPHOMA: ICD-10-CM

## 2020-01-06 ENCOUNTER — TELEPHONE (OUTPATIENT)
Dept: INTERNAL MEDICINE | Facility: CLINIC | Age: 65
End: 2020-01-06

## 2020-01-06 DIAGNOSIS — R42 VERTIGO: ICD-10-CM

## 2020-01-06 RX ORDER — QUETIAPINE FUMARATE 100 MG/1
TABLET, FILM COATED ORAL
Qty: 30 TABLET | Refills: 0 | Status: SHIPPED | OUTPATIENT
Start: 2020-01-06 | End: 2020-02-11

## 2020-01-06 RX ORDER — QUETIAPINE FUMARATE 200 MG/1
TABLET, FILM COATED ORAL
Qty: 30 TABLET | Refills: 0 | Status: SHIPPED | OUTPATIENT
Start: 2020-01-06 | End: 2020-02-11

## 2020-01-06 RX ORDER — CLOPIDOGREL BISULFATE 75 MG/1
TABLET ORAL
Qty: 30 TABLET | Refills: 0 | Status: SHIPPED | OUTPATIENT
Start: 2020-01-06 | End: 2020-02-11

## 2020-01-06 RX ORDER — OXCARBAZEPINE 150 MG/1
TABLET, FILM COATED ORAL
Qty: 180 TABLET | Refills: 0 | Status: SHIPPED | OUTPATIENT
Start: 2020-01-06 | End: 2020-02-11

## 2020-01-06 NOTE — TELEPHONE ENCOUNTER
"Requested Prescriptions   Pending Prescriptions Disp Refills     clopidogrel (PLAVIX) 75 MG tablet [Pharmacy Med Name: Clopidogrel Bisulfate 75 MG Oral Tablet]  0     Sig: TAKE 1 TABLET BY MOUTH ONCE DAILY   Last Written Prescription Date:  11/27/2019  Last Fill Quantity: 30,  # refills: 0   Last office visit: 10/4/2019 with prescribing provider:     Future Office Visit:        Plavix Failed - 1/2/2020  3:05 PM        Failed - Normal HGB on file in past 12 months     Recent Labs   Lab Test 04/01/16  1523   HGB 14.4           Failed - Normal Platelets on file in past 12 months     Recent Labs   Lab Test 04/01/16  1523              Passed - No active PPI on record unless is Protonix        Passed - Recent (12 mo) or future (30 days) visit within the authorizing provider's specialty     Patient has had an office visit with the authorizing provider or a provider within the authorizing providers department within the previous 12 mos or has a future within next 30 days. See \"Patient Info\" tab in inbasket, or \"Choose Columns\" in Meds & Orders section of the refill encounter.            Passed - Medication is active on med list        Passed - Patient is age 18 or older   Routing refill request to provider for review/approval          QUEtiapine (SEROQUEL) 100 MG tablet [Pharmacy Med Name: QUEtiapine Fumarate 100 MG Oral Tablet]  0     Sig: TAKE 1 TABLET BY MOUTH ONCE DAILY   Last Written Prescription Date:  10/15/2019  Last Fill Quantity: 30,  # refills: 1   Last office visit: 10/4/2019 with prescribing provider:     Future Office Visit:        Antipsychotic Medications Failed - 1/2/2020  3:05 PM        Failed - Lipid panel on file within the past 12 months     Recent Labs   Lab Test 03/27/18  1310  03/30/15  1526   CHOL 188   < > 187   TRIG 244*   < > 206*   HDL 46   < > 54   LDL 93   < > 92   NHDL 142*   < >  --    VLDL  --   --  41*   CHOLHDLRATIO  --   --  3.5    < > = values in this interval not displayed.    " "       Failed - CBC on file in past 12 months     Recent Labs   Lab Test 04/01/16  1523   WBC 6.4   RBC 4.77   HGB 14.4   HCT 42.3              Passed - Blood pressure under 140/90 in past 12 months     BP Readings from Last 3 Encounters:   10/04/19 104/66   04/22/19 120/84   04/18/19 130/62           Passed - Patient is 12 years of age or older        Passed - Heart Rate on file within past 12 months     Pulse Readings from Last 3 Encounters:   10/04/19 76   04/22/19 98   04/18/19 100           Passed - A1c or Glucose on file in past 12 months     Recent Labs   Lab Test 10/29/19   GLC 94     Please review patients last 3 weights. If a weight gain of >10 lbs exists, you may refill the prescription once after instructing the patient to schedule an appointment within the next 30 days.    Wt Readings from Last 3 Encounters:   10/04/19 88 kg (194 lb)   04/22/19 93.4 kg (205 lb 12.8 oz)   04/18/19 94.7 kg (208 lb 12.8 oz)           Passed - Medication is active on med list        Passed - Recent (6 mo) or future (30 days) visit within the authorizing provider's specialty     Patient had office visit in the last 6 months or has a visit in the next 30 days with authorizing provider or within the authorizing provider's specialty.  See \"Patient Info\" tab in inbasket, or \"Choose Columns\" in Meds & Orders section of the refill encounter.       Routing refill request to provider for review/approval         OXcarbazepine (TRILEPTAL) 150 MG tablet [Pharmacy Med Name: OXcarbazepine 150 MG Oral Tablet]  0     Sig: TAKE 3 TABLETS BY MOUTH TWICE DAILY       There is no refill protocol information for this order   Routing refill request to provider for review/approval          QUEtiapine (SEROQUEL) 200 MG tablet [Pharmacy Med Name: QUEtiapine Fumarate 200 MG Oral Tablet]  0     Sig: TAKE 1 TABLET BY MOUTH AT BEDTIME       Antipsychotic Medications Failed - 1/2/2020  3:05 PM        Failed - Lipid panel on file within the past 12 " "months     Recent Labs   Lab Test 03/27/18  1310  03/30/15  1526   CHOL 188   < > 187   TRIG 244*   < > 206*   HDL 46   < > 54   LDL 93   < > 92   NHDL 142*   < >  --    VLDL  --   --  41*   CHOLHDLRATIO  --   --  3.5    < > = values in this interval not displayed.             Failed - CBC on file in past 12 months     Recent Labs   Lab Test 04/01/16  1523   WBC 6.4   RBC 4.77   HGB 14.4   HCT 42.3              Passed - Blood pressure under 140/90 in past 12 months     BP Readings from Last 3 Encounters:   10/04/19 104/66   04/22/19 120/84   04/18/19 130/62           Passed - Patient is 12 years of age or older        Passed - Heart Rate on file within past 12 months     Pulse Readings from Last 3 Encounters:   10/04/19 76   04/22/19 98   04/18/19 100           Passed - A1c or Glucose on file in past 12 months     Recent Labs   Lab Test 10/29/19   GLC 94     Please review patients last 3 weights. If a weight gain of >10 lbs exists, you may refill the prescription once after instructing the patient to schedule an appointment within the next 30 days.    Wt Readings from Last 3 Encounters:   10/04/19 88 kg (194 lb)   04/22/19 93.4 kg (205 lb 12.8 oz)   04/18/19 94.7 kg (208 lb 12.8 oz)           Passed - Medication is active on med list        Passed - Recent (6 mo) or future (30 days) visit within the authorizing provider's specialty     Patient had office visit in the last 6 months or has a visit in the next 30 days with authorizing provider or within the authorizing provider's specialty.  See \"Patient Info\" tab in inbasket, or \"Choose Columns\" in Meds & Orders section of the refill encounter.          Routing refill request to provider for review/approval   Amy Forman RN    "

## 2020-01-06 NOTE — TELEPHONE ENCOUNTER
clopidogrel (PLAVIX) 75 MG tablet [Pharmacy Med Name: Clopidogrel Bisulfate 75 MG Oral Tablet]  0    Sig: TAKE 1 TABLET BY MOUTH ONCE DAILY   Routing refill request to provider for review/approval because:  Labs not current:  HGB, PLT  Last labs 4/1/2016      QUEtiapine (SEROQUEL) 100 MG tablet [Pharmacy Med Name: QUEtiapine Fumarate 100 MG Oral Tablet]  0    Sig: TAKE 1 TABLET BY MOUTH ONCE DAILY   Routing refill request to provider for review/approval because:  Labs not current:  Lipid panel, CBC        OXcarbazepine (TRILEPTAL) 150 MG tablet [Pharmacy Med Name: OXcarbazepine 150 MG Oral Tablet]  0    Sig: TAKE 3 TABLETS BY MOUTH TWICE DAILY   Last Written Prescription Date:  9/16/2019  Last Fill Quantity: NA,  # refills: NA   Last office visit: 10/4/2019 with prescribing provider:     Future Office Visit:    Routing refill request to provider for review/approval because:  Medication is reported/historical          QUEtiapine (SEROQUEL) 200 MG tablet [Pharmacy Med Name: QUEtiapine Fumarate 200 MG Oral Tablet]  0    Sig: TAKE 1 TABLET BY MOUTH AT BEDTIME   Routing refill request to provider for review/approval because:  Labs not current:  Lipid panel, CBC      Amy Forman RN

## 2020-01-06 NOTE — TELEPHONE ENCOUNTER
Reason for call:  Other   Patient called regarding (reason for call): Pt said has 5 rx need refills to [facility] in [place]; please advise  Additional comments:     Phone number to reach patient:  Cell number on file:    Telephone Information:   Mobile [phone]       Best Time:  any    Can we leave a detailed message on this number?  YES

## 2020-01-16 ENCOUNTER — TRANSFERRED RECORDS (OUTPATIENT)
Dept: HEALTH INFORMATION MANAGEMENT | Facility: CLINIC | Age: 65
End: 2020-01-16

## 2020-02-10 DIAGNOSIS — C83.390 PRIMARY CNS LYMPHOMA: ICD-10-CM

## 2020-02-10 DIAGNOSIS — I10 ESSENTIAL HYPERTENSION: ICD-10-CM

## 2020-02-11 RX ORDER — CLOPIDOGREL BISULFATE 75 MG/1
TABLET ORAL
Qty: 30 TABLET | Refills: 2 | Status: SHIPPED | OUTPATIENT
Start: 2020-02-11 | End: 2020-05-27

## 2020-02-11 RX ORDER — QUETIAPINE FUMARATE 200 MG/1
TABLET, FILM COATED ORAL
Qty: 30 TABLET | Refills: 2 | Status: SHIPPED | OUTPATIENT
Start: 2020-02-11 | End: 2020-05-27

## 2020-02-11 RX ORDER — QUETIAPINE FUMARATE 100 MG/1
TABLET, FILM COATED ORAL
Qty: 30 TABLET | Refills: 2 | Status: SHIPPED | OUTPATIENT
Start: 2020-02-11 | End: 2020-05-27

## 2020-02-11 RX ORDER — OXCARBAZEPINE 150 MG/1
TABLET, FILM COATED ORAL
Qty: 180 TABLET | Refills: 2 | Status: SHIPPED | OUTPATIENT
Start: 2020-02-11 | End: 2020-06-11

## 2020-02-11 NOTE — TELEPHONE ENCOUNTER
Routing refill request to provider for review/approval because:  Drug not on the [facility] refill protocol-oxycarbazepine  Approved-plavix, quetiapine.  Radha Ness RN

## 2020-02-11 NOTE — TELEPHONE ENCOUNTER
quetiapine  Last Written Prescription Date:  1/6/2020  Last Fill Quantity: 30,  # refills: 0   Last office visit: 10/4/2019 with prescribing provider:      Future Office Visit:      Requested Prescriptions   Pending Prescriptions Disp Refills     QUEtiapine (SEROQUEL) 200 MG tablet [Pharmacy Med Name: QUEtiapine Fumarate 200 MG Oral Tablet]  0     Sig: TAKE 1 TABLET BY MOUTH AT BEDTIME       Antipsychotic Medications Failed - 2/10/2020  8:11 AM        Failed - Lipid panel on file within the past 12 months     Recent Labs   Lab Test 03/27/18  1310  03/30/15  1526   CHOL 188   < > 187   TRIG 244*   < > 206*   HDL 46   < > 54   LDL 93   < > 92   NHDL 142*   < >  --    VLDL  --   --  41*   CHOLHDLRATIO  --   --  3.5    < > = values in this interval not displayed.             Failed - CBC on file in past 12 months     Recent Labs   Lab Test 04/01/16  1523   WBC 6.4   RBC 4.77   HGB 14.4   HCT 42.3              Passed - Blood pressure under 140/90 in past 12 months     BP Readings from Last 3 Encounters:   10/04/19 104/66   04/22/19 120/84   04/18/19 130/62           Passed - Patient is 12 years of age or older        Passed - Heart Rate on file within past 12 months     Pulse Readings from Last 3 Encounters:   10/04/19 76   04/22/19 98   04/18/19 100           Passed - A1c or Glucose on file in past 12 months     Recent Labs   Lab Test 10/29/19   GLC 94   Please review patients last 3 weights. If a weight gain of >10 lbs exists, you may refill the prescription once after instructing the patient to schedule an appointment within the next 30 days.    Wt Readings from Last 3 Encounters:   10/04/19 88 kg (194 lb)   04/22/19 93.4 kg (205 lb 12.8 oz)   04/18/19 94.7 kg (208 lb 12.8 oz)             Passed - Medication is active on med list        Passed - Recent (6 mo) or future (30 days) visit within the authorizing provider's specialty     Patient had office visit in the last 6 months or has a visit in the next 30 days  "with authorizing provider or within the authorizing provider's specialty.  See \"Patient Info\" tab in inbasket, or \"Choose Columns\" in Meds & Orders section of the refill encounter.         Quetiapine  Last Written Prescription Date:  1/6/2020  Last Fill Quantity: 30,  # refills: 0   Last office visit: 10/4/2019 with prescribing provider:      Future Office Visit:         QUEtiapine (SEROQUEL) 100 MG tablet [Pharmacy Med Name: QUEtiapine Fumarate 100 MG Oral Tablet]  0     Sig: TAKE 1 TABLET BY MOUTH ONCE DAILY       Antipsychotic Medications Failed - 2/10/2020  8:11 AM        Failed - Lipid panel on file within the past 12 months     Recent Labs   Lab Test 03/27/18  1310  03/30/15  1526   CHOL 188   < > 187   TRIG 244*   < > 206*   HDL 46   < > 54   LDL 93   < > 92   NHDL 142*   < >  --    VLDL  --   --  41*   CHOLHDLRATIO  --   --  3.5    < > = values in this interval not displayed.           Failed - CBC on file in past 12 months     Recent Labs   Lab Test 04/01/16  1523   WBC 6.4   RBC 4.77   HGB 14.4   HCT 42.3              Passed - Blood pressure under 140/90 in past 12 months     BP Readings from Last 3 Encounters:   10/04/19 104/66   04/22/19 120/84   04/18/19 130/62           Passed - Patient is 12 years of age or older        Passed - Heart Rate on file within past 12 months     Pulse Readings from Last 3 Encounters:   10/04/19 76   04/22/19 98   04/18/19 100           Passed - A1c or Glucose on file in past 12 months     Recent Labs   Lab Test 10/29/19   GLC 94     Please review patients last 3 weights. If a weight gain of >10 lbs exists, you may refill the prescription once after instructing the patient to schedule an appointment within the next 30 days.    Wt Readings from Last 3 Encounters:   10/04/19 88 kg (194 lb)   04/22/19 93.4 kg (205 lb 12.8 oz)   04/18/19 94.7 kg (208 lb 12.8 oz)             Passed - Medication is active on med list        Passed - Recent (6 mo) or future (30 days) visit " "within the authorizing provider's specialty     Patient had office visit in the last 6 months or has a visit in the next 30 days with authorizing provider or within the authorizing provider's specialty.  See \"Patient Info\" tab in inbasket, or \"Choose Columns\" in Meds & Orders section of the refill encounter.           Oxcarbazepine  Last Written Prescription Date:  1/6/2020  Last Fill Quantity: 180,  # refills: 0   Last office visit: 10/4/2019 with prescribing provider:      Future Office Visit:         OXcarbazepine (TRILEPTAL) 150 MG tablet [Pharmacy Med Name: OXcarbazepine 150 MG Oral Tablet]  0     Sig: TAKE 3 TABLETS BY MOUTH TWICE DAILY       There is no refill protocol information for this order       Clopidogrel  Last Written Prescription Date:  1/6/2020  Last Fill Quantity: 30,  # refills: 0   Last office visit: 10/4/2019 with prescribing provider:      Future Office Visit:         clopidogrel (PLAVIX) 75 MG tablet [Pharmacy Med Name: Clopidogrel Bisulfate 75 MG Oral Tablet]  0     Sig: TAKE 1 TABLET BY MOUTH ONCE DAILY       Plavix Failed - 2/10/2020  8:11 AM        Failed - Normal HGB on file in past 12 months     Recent Labs   Lab Test 04/01/16  1523   HGB 14.4           Failed - Normal Platelets on file in past 12 months     Recent Labs   Lab Test 04/01/16  1523              Passed - No active PPI on record unless is Protonix        Passed - Recent (12 mo) or future (30 days) visit within the authorizing provider's specialty     Patient has had an office visit with the authorizing provider or a provider within the authorizing providers department within the previous 12 mos or has a future within next 30 days. See \"Patient Info\" tab in inbasket, or \"Choose Columns\" in Meds & Orders section of the refill encounter.              Passed - Medication is active on med list        Passed - Patient is age 18 or older        Radha Ness RN on 2/11/2020 at 2:27 PM    "

## 2020-02-12 ENCOUNTER — TELEPHONE (OUTPATIENT)
Dept: INTERNAL MEDICINE | Facility: CLINIC | Age: 65
End: 2020-02-12

## 2020-02-12 NOTE — TELEPHONE ENCOUNTER
Writer spoke to Connie at [facility] and patient is able to fill the Metoprolol on 2/23/2020. It is just too early for patient to fill them.     Oxcarbazepine is requiring a PA. His insurance is only allowing 2.6 tablets a day. She stated they have faxed a PA request over.     Patient was informed that he is able to fill the Metoprolol on 2/23/2020. It is just too early for him to fill them.     Oxcarbazepine is requiring a PA. That his insurance is only allowing 2.6 tablets a day.     Khushi Mcghee, [facility]

## 2020-02-12 NOTE — TELEPHONE ENCOUNTER
Reason for Call:  Other prescription    Detailed comments: Pt is having issues with pharmacy filling two prescriptions. Metropolol is on back order until 02/23/20, and for Oxcarbazetime, the pharmacy is reporting that the quantity is too much, they can only give him 2.6 pills twice a day as opposed to the prescribed 3 pills twice a day.    Phone Number Patient can be reached at: Home number on file [phone] (home)    Best Time: Anytime    Can we leave a detailed message on this number? YES    Call taken on 2/12/2020 at 9:07 AM by Jeniffer Tilley

## 2020-02-13 ENCOUNTER — TRANSFERRED RECORDS (OUTPATIENT)
Dept: HEALTH INFORMATION MANAGEMENT | Facility: CLINIC | Age: 65
End: 2020-02-13

## 2020-02-13 ENCOUNTER — TELEPHONE (OUTPATIENT)
Dept: INTERNAL MEDICINE | Facility: CLINIC | Age: 65
End: 2020-02-13

## 2020-02-13 NOTE — TELEPHONE ENCOUNTER
Prior Authorization Retail Medication Request    Medication/Dose: OXcarbazepine (TRILEPTAL) 150 MG tablet//TAKE 3 TABLETS BY MOUTH TWICE DAILY  ICD code (if different than what is on RX):    Previously Tried and Failed:    Rationale:      Insurance Name:  [facility] MEDICARE ADVANTAGE (Managed Care)  Insurance ID:  [id number]      Pharmacy Information (if different than what is on RX)  Name:    Phone:

## 2020-02-17 NOTE — TELEPHONE ENCOUNTER
Central Prior Authorization Team   Phone: [phone]    PA Initiation    Medication: OXcarbazepine (TRILEPTAL) 150 MG tablet  Insurance Company: [employer] - Phone [phone] Fax [phone]  Pharmacy Filling the Rx: [facility] [address] [place] [address]  Filling Pharmacy Phone: [phone]  Filling Pharmacy Fax: [phone]  Start Date: 2/17/2020

## 2020-02-18 NOTE — TELEPHONE ENCOUNTER
Prior Authorization Approval    Authorization Effective Date: 2/17/2020  Authorization Expiration Date: 12/31/2020  Medication: OXcarbazepine (TRILEPTAL) 150 MG-APPROVED  Approved Dose/Quantity:    Reference #:     Insurance Company: [employer] [phone] Fax [phone]  Expected CoPay:       CoPay Card Available:      Foundation Assistance Needed:    Which Pharmacy is filling the prescription (Not needed for infusion/clinic administered): [facility] [address] [place] [address]  Pharmacy Notified: Yes  Patient Notified: Yes  **Instructed pharmacy to notify patient when script is ready to /ship.**

## 2020-02-25 DIAGNOSIS — C83.390 PRIMARY CNS LYMPHOMA: Primary | ICD-10-CM

## 2020-02-27 DIAGNOSIS — C83.390 PRIMARY CNS LYMPHOMA: ICD-10-CM

## 2020-02-27 LAB
ALBUMIN SERPL-MCNC: 3.9 G/DL (ref 3.4–5)
ALP SERPL-CCNC: 97 U/L (ref 40–150)
ALT SERPL W P-5'-P-CCNC: 67 U/L (ref 0–70)
ANION GAP SERPL CALCULATED.3IONS-SCNC: 6 MMOL/L (ref 3–14)
AST SERPL W P-5'-P-CCNC: 18 U/L (ref 0–45)
BASOPHILS # BLD AUTO: 0 10E9/L (ref 0–0.2)
BASOPHILS NFR BLD AUTO: 0.7 %
BILIRUB SERPL-MCNC: 0.3 MG/DL (ref 0.2–1.3)
BUN SERPL-MCNC: 24 MG/DL (ref 7–30)
CALCIUM SERPL-MCNC: 8.4 MG/DL (ref 8.5–10.1)
CHLORIDE SERPL-SCNC: 111 MMOL/L (ref 94–109)
CO2 SERPL-SCNC: 27 MMOL/L (ref 20–32)
CREAT SERPL-MCNC: 1.18 MG/DL (ref 0.66–1.25)
DIFFERENTIAL METHOD BLD: ABNORMAL
EOSINOPHIL NFR BLD AUTO: 3.9 %
ERYTHROCYTE [DISTWIDTH] IN BLOOD BY AUTOMATED COUNT: 14.2 % (ref 10–15)
GFR SERPL CREATININE-BSD FRML MDRD: 63 ML/MIN/{1.73_M2}
GLUCOSE SERPL-MCNC: 119 MG/DL (ref 70–99)
HCT VFR BLD AUTO: 31.6 % (ref 40–53)
HGB BLD-MCNC: 10.5 G/DL (ref 13.3–17.7)
IMM GRANULOCYTES # BLD: 0 10E9/L (ref 0–0.4)
IMM GRANULOCYTES NFR BLD: 0.2 %
LYMPHOCYTES # BLD AUTO: 1 10E9/L (ref 0.8–5.3)
LYMPHOCYTES NFR BLD AUTO: 23.4 %
MCH RBC QN AUTO: 30.9 PG (ref 26.5–33)
MCHC RBC AUTO-ENTMCNC: 33.2 G/DL (ref 31.5–36.5)
MCV RBC AUTO: 93 FL (ref 78–100)
MONOCYTES # BLD AUTO: 0.4 10E9/L (ref 0–1.3)
MONOCYTES NFR BLD AUTO: 10 %
NEUTROPHILS # BLD AUTO: 2.5 10E9/L (ref 1.6–8.3)
NEUTROPHILS NFR BLD AUTO: 61.8 %
NRBC # BLD AUTO: 0 10*3/UL
NRBC BLD AUTO-RTO: 0 /100
PLATELET # BLD AUTO: 211 10E9/L (ref 150–450)
POTASSIUM SERPL-SCNC: 3.7 MMOL/L (ref 3.4–5.3)
PROT SERPL-MCNC: 6.8 G/DL (ref 6.8–8.8)
RBC # BLD AUTO: 3.4 10E12/L (ref 4.4–5.9)
SODIUM SERPL-SCNC: 144 MMOL/L (ref 133–144)
WBC # BLD AUTO: 4.1 10E9/L (ref 4–11)

## 2020-02-27 PROCEDURE — 36415 COLL VENOUS BLD VENIPUNCTURE: CPT | Performed by: INTERNAL MEDICINE

## 2020-02-27 PROCEDURE — 80053 COMPREHEN METABOLIC PANEL: CPT | Performed by: INTERNAL MEDICINE

## 2020-02-27 PROCEDURE — 85025 COMPLETE CBC W/AUTO DIFF WBC: CPT | Performed by: INTERNAL MEDICINE

## 2020-03-12 ENCOUNTER — TRANSFERRED RECORDS (OUTPATIENT)
Dept: HEALTH INFORMATION MANAGEMENT | Facility: CLINIC | Age: 65
End: 2020-03-12

## 2020-03-12 DIAGNOSIS — E78.5 HYPERLIPIDEMIA LDL GOAL <130: ICD-10-CM

## 2020-03-12 LAB — INR PPP: 1.1 (ref 0.9–1.1)

## 2020-03-15 ENCOUNTER — HEALTH MAINTENANCE LETTER (OUTPATIENT)
Age: 65
End: 2020-03-15

## 2020-03-16 ENCOUNTER — TRANSFERRED RECORDS (OUTPATIENT)
Dept: MULTI SPECIALTY CLINIC | Facility: CLINIC | Age: 65
End: 2020-03-16

## 2020-03-16 LAB
ALT SERPL-CCNC: 73 U/L (ref 8–45)
AST SERPL-CCNC: 43 U/L (ref 5–41)
CREAT SERPL-MCNC: 0.89 MG/DL (ref 0.72–1.25)
GFR SERPL CREATININE-BSD FRML MDRD: >60 ML/MIN/1.73M2
GLUCOSE SERPL-MCNC: 93 MG/DL (ref 70–100)
POTASSIUM SERPL-SCNC: 3.8 MMOL/L (ref 3.5–5.1)

## 2020-03-16 RX ORDER — ATORVASTATIN CALCIUM 20 MG/1
TABLET, FILM COATED ORAL
Qty: 90 TABLET | Refills: 0 | Status: SHIPPED | OUTPATIENT
Start: 2020-03-16 | End: 2020-06-11

## 2020-03-16 NOTE — TELEPHONE ENCOUNTER
"Requested Prescriptions   Pending Prescriptions Disp Refills     atorvastatin (LIPITOR) 20 MG tablet [Pharmacy Med Name: ATORVASTATIN CALCIUM 20 MG Tablet] 90 tablet 0     Sig: TAKE 1 TABLET EVERY DAY   Last Written Prescription Date:  12/31/19  Last Fill Quantity: 90,  # refills: 0   Last office visit: 10/4/2019 with prescribing provider:  Sang   Future Office Visit:        Statins Protocol Failed - 3/12/2020  1:48 PM        Failed - LDL on file in past 12 months     Recent Labs   Lab Test 03/27/18  1310   LDL 93           Passed - No abnormal creatine kinase in past 12 months     No lab results found.             Passed - Recent (12 mo) or future (30 days) visit within the authorizing provider's specialty     Patient has had an office visit with the authorizing provider or a provider within the authorizing providers department within the previous 12 mos or has a future within next 30 days. See \"Patient Info\" tab in inbasket, or \"Choose Columns\" in Meds & Orders section of the refill encounter.              Passed - Medication is active on med list        Passed - Patient is age 18 or older             "

## 2020-03-16 NOTE — TELEPHONE ENCOUNTER
Routing refill request to provider for review/approval because:  Labs not current:  ldl    Christy Trotter RN on 3/16/2020 at 9:17 AM

## 2020-04-09 ENCOUNTER — TRANSFERRED RECORDS (OUTPATIENT)
Dept: HEALTH INFORMATION MANAGEMENT | Facility: CLINIC | Age: 65
End: 2020-04-09

## 2020-05-13 ENCOUNTER — TRANSFERRED RECORDS (OUTPATIENT)
Dept: HEALTH INFORMATION MANAGEMENT | Facility: CLINIC | Age: 65
End: 2020-05-13

## 2020-05-13 LAB
ALT SERPL-CCNC: 38 U/L (ref 8–45)
AST SERPL-CCNC: 25 U/L (ref 5–41)
CREAT SERPL-MCNC: 1.63 MG/DL (ref 0.72–1.25)
GFR SERPL CREATININE-BSD FRML MDRD: 43 ML/MIN/1.73M2
GLUCOSE SERPL-MCNC: 87 MG/DL (ref 70–100)
POTASSIUM SERPL-SCNC: 4.2 MMOL/L (ref 3.5–5.1)

## 2020-05-27 ENCOUNTER — VIRTUAL VISIT (OUTPATIENT)
Dept: INTERNAL MEDICINE | Facility: CLINIC | Age: 65
End: 2020-05-27
Payer: COMMERCIAL

## 2020-05-27 DIAGNOSIS — I10 ESSENTIAL HYPERTENSION: ICD-10-CM

## 2020-05-27 DIAGNOSIS — C83.390 PRIMARY CNS LYMPHOMA: Primary | ICD-10-CM

## 2020-05-27 DIAGNOSIS — Z86.69 HISTORY OF ENCEPHALOPATHY: ICD-10-CM

## 2020-05-27 PROCEDURE — 99214 OFFICE O/P EST MOD 30 MIN: CPT | Mod: 95 | Performed by: INTERNAL MEDICINE

## 2020-05-27 NOTE — PROGRESS NOTES
"Jaswinder Thompson Sr. is a 65 year old male who is being evaluated via a billable video visit.      The patient has been notified of following:     \"This video visit will be conducted via a call between you and your physician/provider. We have found that certain health care needs can be provided without the need for an in-person physical exam.  This service lets us provide the care you need with a video conversation.  If a prescription is necessary we can send it directly to your pharmacy.  If lab work is needed we can place an order for that and you can then stop by our lab to have the test done at a later time.    Video visits are billed at different rates depending on your insurance coverage.  Please reach out to your insurance provider with any questions.    If during the course of the call the physician/provider feels a video visit is not appropriate, you will not be charged for this service.\"    Patient has given verbal consent for Video visit? Yes    How would you like to obtain your AVS? [facility]    Patient would like the video invitation sent by: Text to cell phone: [phone]    Will anyone else be joining your video visit? No    Subjective     Jaswinder Thompson Sr. is a 65 year old male who presents today via video visit for the following health issues:    HPI  Chief Complaint   Patient presents with     Recheck Medication     Wants to know what medication he should be taking now after being told he is in remission.      RECHECK     Wants to update on his progress.        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  Completed 12 cycles and has continued to maintain complete remission.  Has followup in 6 months with a repeat MRI and will continue to see Dr Goddard.      Needs his prescriptions refilled and needs to know what he needs to take.  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      He hasn't regularly seen a psychiatrist just had " encephalopathy with his lymphoma.  Retired and lives at home with his wife.    Wants to get rid of some pills.        Patient Active Problem List   Diagnosis     ABNL BLOOD EXAM FINDING OTHER SPEC elev psa     Malignant neoplasm of prostate (H)     HYPERLIPIDEMIA LDL GOAL <130     Encounter for long-term current use of medication     Advanced directives, counseling/discussion     S/P rotator cuff repair left     Past Surgical History:   Procedure Laterality Date     ARTHROSCOPY SHOULDER DECOMPRESSION Left 2015    Procedure: ARTHROSCOPY SHOULDER DECOMPRESSION;  Surgeon: Arturo Martinez MD;  Location:  OR     ARTHROSCOPY SHOULDER ROTATOR CUFF REPAIR Left 2015    Procedure: ARTHROSCOPY SHOULDER ROTATOR CUFF REPAIR;  Surgeon: Arturo Martinez MD;  Location:  OR     C REMV PROSTATE,RETROPUB,RADICAL  2005    Nerve sparing radical retropubic prostatectomy and pelvic lymph node dissection.  [facility].     COLONOSCOPY  3/28/2011    COMBINED COLONOSCOPY, SINGLE BIOPSY/POLYPECTOMY BY BIOPSY performed by DONAVON GOTTLIEB at  GI     COLONOSCOPY N/A 2016    Procedure: COMBINED COLONOSCOPY, SINGLE OR MULTIPLE BIOPSY/POLYPECTOMY BY BIOPSY;  Surgeon: Alec Mclain MD;  Location:  GI     HC EXCISION BREAST LESION, OPEN >=1  08    Right     HC REMOVE TONSILS/ADENOIDS,<11 Y/O      unsure of age       Social History     Tobacco Use     Smoking status: Former Smoker     Last attempt to quit: 2005     Years since quittin.9     Smokeless tobacco: Never Used   Substance Use Topics     Alcohol use: Yes     Alcohol/week: 0.0 standard drinks     Comment: < one twelve pack beer per week     Family History   Problem Relation Age of Onset     Hypertension Father      Allergies Father         bees     Alzheimer Disease Father      Arthritis Father      Cerebrovascular Disease Mother      Eye Disorder Mother         cataract     Heart Disease Paternal Grandfather       Other Cancer Son         kidney cancer     Breast Cancer Sister      Heart Disease Sister      Cancer Brother      Cardiovascular Brother         Stent placed in his 60's     Cancer Sister         lung cancer         Current Outpatient Medications   Medication Sig Dispense Refill     ASPIRIN 81 MG OR TABS ONE DAILY 100 3     atorvastatin (LIPITOR) 20 MG tablet TAKE 1 TABLET EVERY DAY 90 tablet 0     calcium-vitamin D (OSCAL) 250-125 MG-UNIT TABS per tablet Take 1 tablet by mouth       clopidogrel (PLAVIX) 75 MG tablet TAKE 1 TABLET BY MOUTH ONCE DAILY 30 tablet 2     metoprolol tartrate (LOPRESSOR) 25 MG tablet TAKE 1 TABLET BY MOUTH TWICE DAILY 60 tablet 8     multivitamin, therapeutic with minerals (THERA-VIT-M) TABS tablet Take 1 tablet by mouth daily       OXcarbazepine (TRILEPTAL) 150 MG tablet TAKE 3 TABLETS BY MOUTH TWICE DAILY 180 tablet 2     QUEtiapine (SEROQUEL) 100 MG tablet TAKE 1 TABLET BY MOUTH ONCE DAILY 30 tablet 2     diphenhydrAMINE (BENADRYL) 50 MG capsule Take 50 mg by mouth nightly as needed for itching or allergies       MAPAP 325 MG tablet Take 650 mg by mouth every 4 hours as needed  0     meclizine (ANTIVERT) 25 MG tablet Take 1 tablet (25 mg) by mouth 3 times daily as needed for dizziness 20 tablet 0     melatonin 3 MG tablet Take 1 mg by mouth nightly as needed for sleep       polyethylene glycol (MIRALAX/GLYCOLAX) packet Take 17 g by mouth daily as needed       temozolomide (TEMODAR) 100 MG capsule CHEMO Take 400 mg by mouth daily Takes every 28 days for 5 days.       Thiamine HCl 250 MG TABS Take 250 mg by mouth       Allergies   Allergen Reactions     Nickel      Patch testing elsewhere per patient report     No Known Drug Allergies        Reviewed and updated as needed this visit by Provider         Review of Systems   Constitutional, HEENT, cardiovascular, pulmonary, gi and gu systems are negative, except as otherwise noted.      Objective    There were no vitals taken for this  "visit.  Estimated body mass index is 27.06 kg/m  as calculated from the following:    Height as of 4/22/19: 1.803 m (5' 11\").    Weight as of 10/4/19: 88 kg (194 lb).  Physical Exam     GENERAL: Healthy, alert and no distress  EYES: Eyes grossly normal to inspection.  No discharge or erythema, or obvious scleral/conjunctival abnormalities.  RESP: No audible wheeze, cough, or visible cyanosis.  No visible retractions or increased work of breathing.    SKIN: Visible skin clear. No significant rash, abnormal pigmentation or lesions.  NEURO: Cranial nerves grossly intact.  Mentation and speech appropriate for age.  PSYCH: Mentation appears normal, affect normal/bright, judgement and insight intact, normal speech and appearance well-groomed.      Diagnostic Test Results:  Labs reviewed in Epic    reviewed results from [facility].     Assessment & Plan       ICD-10-CM    1. 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 Essential hypertension  I10    3. History of encephalopathy  Z86.69      This is a very complex 65-year-old gentleman who only had a hyper lipidemia before 2019 when he was found to be encephalopathic.  There was concerns whether it was related to alcohol or some other cause.  Finally was found on a biopsy of the brain to have a CNS lymphoma.  He went through multiple rounds of chemotherapy.  He had stay in the hospital and had a lot of behavioral issues and encephalopathy.  He got started on Seroquel for behaviors.  He was on Trileptal for seizure prevention.  He had a pulmonary embolus related to the lymphoma.  He has Plavix for some reason but never had a stroke or heart attack.    Now the patient is in remission his oncologist recommended talk to us about getting off some medications.    Behaviors are normal not feeling depressed living at home retired, active lives with his wife.  We will wean down his Seroquel stopping the 100 mg in the morning and then eventually " stopping the 200 mg dose at night.  For now we will continue the Trileptal and see him back in 3 to 4 weeks to then wean down the Trileptal.    Without any findings of vascular disease, I reviewed his CT angiograms, MR angiograms and carotid ultrasounds.  They were all clear without stenosis.  He can stop the Plavix.  He will continue baby aspirin and atorvastatin.    Question of blood pressure he will check his blood pressures at home and then in 4 weeks we will see if he can stop his metoprolol.    The patient is eating well he will cut out a lot of his vitamins and extra things such as to him and he was taking.    Concerning alcohol we recommended he really limit any alcohol which she is already doing at this time.        Return in about 4 weeks (around 6/24/2020) for Follow up this diagnosis.    Javon Domínguez MD  [facility]      Video-Visit Details    Type of service:  Video Visit    Video End Time:10:04 AM    Originating Location (pt. Location): Home    Distant Location (provider location):  [facility]     Platform used for Video Visit: Doximity    No follow-ups on file.       Javon Domínguez MD

## 2020-05-27 NOTE — NURSING NOTE
Chief Complaint   Patient presents with     Recheck Medication     Wants to know what medication he should be taking now after being told he is in remission.      RECHECK     Wants to update on his progress.      MP/MA

## 2020-06-11 ENCOUNTER — VIRTUAL VISIT (OUTPATIENT)
Dept: INTERNAL MEDICINE | Facility: CLINIC | Age: 65
End: 2020-06-11
Payer: COMMERCIAL

## 2020-06-11 DIAGNOSIS — E78.5 HYPERLIPIDEMIA LDL GOAL <130: ICD-10-CM

## 2020-06-11 DIAGNOSIS — N17.9 ACUTE KIDNEY INJURY SUPERIMPOSED ON CHRONIC KIDNEY DISEASE (H): ICD-10-CM

## 2020-06-11 DIAGNOSIS — C83.390 PRIMARY CNS LYMPHOMA: Primary | ICD-10-CM

## 2020-06-11 DIAGNOSIS — N18.9 ACUTE KIDNEY INJURY SUPERIMPOSED ON CHRONIC KIDNEY DISEASE (H): ICD-10-CM

## 2020-06-11 PROCEDURE — 99214 OFFICE O/P EST MOD 30 MIN: CPT | Mod: 95 | Performed by: INTERNAL MEDICINE

## 2020-06-11 RX ORDER — ATORVASTATIN CALCIUM 20 MG/1
TABLET, FILM COATED ORAL
Qty: 90 TABLET | Refills: 3 | Status: SHIPPED | OUTPATIENT
Start: 2020-06-11 | End: 2020-08-05

## 2020-06-11 NOTE — PROGRESS NOTES
"Jaswinder Thompson Sr. is a 65 year old male who is being evaluated via a billable video visit.      The patient has been notified of following:     \"This video visit will be conducted via a call between you and your physician/provider. We have found that certain health care needs can be provided without the need for an in-person physical exam.  This service lets us provide the care you need with a video conversation.  If a prescription is necessary we can send it directly to your pharmacy.  If lab work is needed we can place an order for that and you can then stop by our lab to have the test done at a later time.    Video visits are billed at different rates depending on your insurance coverage.  Please reach out to your insurance provider with any questions.    If during the course of the call the physician/provider feels a video visit is not appropriate, you will not be charged for this service.\"    Patient has given verbal consent for Video visit? Yes    Will anyone else be joining your video visit? No    Subjective     Jaswinder Thompson Sr. is a 65 year old male who presents today via video visit for the following health issues:    HPI  Chief Complaint   Patient presents with     Video Visit     blood pressure f/u - pt states he has decreased the dosage of the trileptal       Video Start Time: 9:12 AM    We did a video visit 4 weeks ago. His CNS lymphoma is in remission. Now working to get off his medications if not needed.    He is now off seroquel for 2 weeks, can't tell a difference.    No physical problems, no chest pains, no sob. Up at night to go the bathroom, eating ok.     Trileptal he has cut back to 2 tablets twice a day, no change.     He has had metoprolol 25 twice a day.  /78.      Last creatinine in May was 1.63, normally was lower, had chemotherapy the last year     Patient Active Problem List   Diagnosis     ABNL BLOOD EXAM FINDING OTHER SPEC elev psa     Malignant neoplasm of prostate (H) "     HYPERLIPIDEMIA LDL GOAL <130     Encounter for long-term current use of medication     Advanced directives, counseling/discussion     S/P rotator cuff repair left     Past Surgical History:   Procedure Laterality Date     ARTHROSCOPY SHOULDER DECOMPRESSION Left 2015    Procedure: ARTHROSCOPY SHOULDER DECOMPRESSION;  Surgeon: Arturo Martinez MD;  Location: [facility]     ARTHROSCOPY SHOULDER ROTATOR CUFF REPAIR Left 2015    Procedure: ARTHROSCOPY SHOULDER ROTATOR CUFF REPAIR;  Surgeon: Arturo Martinez MD;  Location: [facility] OR     C REMV PROSTATE,RETROPUB,RADICAL  2005    Nerve sparing radical retropubic prostatectomy and pelvic lymph node dissection.  [facility].     COLONOSCOPY  3/28/2011    COMBINED COLONOSCOPY, SINGLE BIOPSY/POLYPECTOMY BY BIOPSY performed by DONAVON GOTTLIEB at  GI     COLONOSCOPY N/A 2016    Procedure: COMBINED COLONOSCOPY, SINGLE OR MULTIPLE BIOPSY/POLYPECTOMY BY BIOPSY;  Surgeon: Alec Mclain MD;  Location:  GI     HC EXCISION BREAST LESION, OPEN >=1  08    Right     HC REMOVE TONSILS/ADENOIDS,<11 Y/O      unsure of age       Social History     Tobacco Use     Smoking status: Former Smoker     Last attempt to quit: 2005     Years since quittin.9     Smokeless tobacco: Never Used   Substance Use Topics     Alcohol use: Yes     Alcohol/week: 0.0 standard drinks     Comment: < one twelve pack beer per week     Family History   Problem Relation Age of Onset     Hypertension Father      Allergies Father         bees     Alzheimer Disease Father      Arthritis Father      Cerebrovascular Disease Mother      Eye Disorder Mother         cataract     Heart Disease Paternal Grandfather      Other Cancer Son         kidney cancer     Breast Cancer Sister      Heart Disease Sister      Cancer Brother      Cardiovascular Brother         Stent placed in his 60's     Cancer Sister         lung cancer         Current Outpatient  "Medications   Medication Sig Dispense Refill     ASPIRIN 81 MG OR TABS ONE DAILY 100 3     atorvastatin (LIPITOR) 20 MG tablet TAKE 1 TABLET EVERY DAY 90 tablet 0     melatonin 3 MG tablet Take 1 mg by mouth nightly as needed for sleep       metoprolol tartrate (LOPRESSOR) 25 MG tablet TAKE 1 TABLET BY MOUTH TWICE DAILY 60 tablet 8     multivitamin, therapeutic with minerals (THERA-VIT-M) TABS tablet Take 1 tablet by mouth daily       OXcarbazepine (TRILEPTAL) 150 MG tablet TAKE 3 TABLETS BY MOUTH TWICE DAILY 180 tablet 2     calcium-vitamin D (OSCAL) 250-125 MG-UNIT TABS per tablet Take 1 tablet by mouth       polyethylene glycol (MIRALAX/GLYCOLAX) packet Take 17 g by mouth daily as needed       Allergies   Allergen Reactions     Nickel      Patch testing elsewhere per patient report     No Known Drug Allergies        Reviewed and updated as needed this visit by Provider         Review of Systems   Constitutional, HEENT, cardiovascular, pulmonary, gi and gu systems are negative, except as otherwise noted.      Objective    There were no vitals taken for this visit.  Estimated body mass index is 27.06 kg/m  as calculated from the following:    Height as of 4/22/19: 1.803 m (5' 11\").    Weight as of 10/4/19: 88 kg (194 lb).  Physical Exam     GENERAL: Healthy, alert and no distress  EYES: Eyes grossly normal to inspection.  No discharge or erythema, or obvious scleral/conjunctival abnormalities.  RESP: No audible wheeze, cough, or visible cyanosis.  No visible retractions or increased work of breathing.    SKIN: Visible skin clear. No significant rash, abnormal pigmentation or lesions.  NEURO: Cranial nerves grossly intact.  Mentation and speech appropriate for age.  PSYCH: Mentation appears normal, affect normal/bright, judgement and insight intact, normal speech and appearance well-groomed.      Diagnostic Test Results:  Labs reviewed in Epic        Assessment & Plan       ICD-10-CM    1. Primary CNS lymphoma (H)  " C85.89    2. Hyperlipidemia LDL goal <130  E78.5 atorvastatin (LIPITOR) 20 MG tablet   3. Acute kidney injury superimposed on chronic kidney disease (H)  N17.9     N18.9      This a patient who had primary CNS lymphoma is been a difficult year for him.  He had a lot of behavioral issues with this and confusion.  He is done much better.  He was on Seroquel and Trileptal.  Because his lymphoma is felt to be in remission and nothing seen he can go off these medications per his oncologist.  We got him off Seroquel 2 weeks ago and has been doing fine.  He will wean down his Trileptal and stop that.    His blood pressures been good and I think he go off the metoprolol he will wean off that next week or completely stop it as it is low-dose.  He will follow his blood pressure and make sure he is under 130/90.    We reviewed his last labs and his creatinine is elevated at 1.6 in mid May.  I want to recheck this.  He has no reason for kidney disease at this time.  He will come in for a lab tomorrow.    Hyperlipidemia we will continue his atorvastatin and that is been refilled for the next year.        Return in about 6 months (around 12/11/2020) for oncology.    Javon Domínguez MD  [facility]      Video-Visit Details    Type of service:  Video Visit    Video End Time:9:30 AM    Originating Location (pt. Location): Home    Distant Location (provider location):  [facility]     Platform used for Video Visit: Doximity    No follow-ups on file.       Javon Domínguez MD

## 2020-06-12 DIAGNOSIS — N17.9 ACUTE KIDNEY INJURY SUPERIMPOSED ON CHRONIC KIDNEY DISEASE (H): ICD-10-CM

## 2020-06-12 DIAGNOSIS — N18.9 ACUTE KIDNEY INJURY SUPERIMPOSED ON CHRONIC KIDNEY DISEASE (H): ICD-10-CM

## 2020-06-12 LAB
ANION GAP SERPL CALCULATED.3IONS-SCNC: 6 MMOL/L (ref 3–14)
BUN SERPL-MCNC: 20 MG/DL (ref 7–30)
CALCIUM SERPL-MCNC: 8.9 MG/DL (ref 8.5–10.1)
CHLORIDE SERPL-SCNC: 107 MMOL/L (ref 94–109)
CO2 SERPL-SCNC: 27 MMOL/L (ref 20–32)
CREAT SERPL-MCNC: 1.27 MG/DL (ref 0.66–1.25)
GFR SERPL CREATININE-BSD FRML MDRD: 58 ML/MIN/{1.73_M2}
GLUCOSE SERPL-MCNC: 115 MG/DL (ref 70–99)
POTASSIUM SERPL-SCNC: 4.1 MMOL/L (ref 3.4–5.3)
SODIUM SERPL-SCNC: 140 MMOL/L (ref 133–144)

## 2020-06-12 PROCEDURE — 36415 COLL VENOUS BLD VENIPUNCTURE: CPT | Performed by: INTERNAL MEDICINE

## 2020-06-12 PROCEDURE — 80048 BASIC METABOLIC PNL TOTAL CA: CPT | Performed by: INTERNAL MEDICINE

## 2020-07-15 ENCOUNTER — OFFICE VISIT (OUTPATIENT)
Dept: INTERNAL MEDICINE | Facility: CLINIC | Age: 65
End: 2020-07-15
Payer: COMMERCIAL

## 2020-07-15 VITALS
SYSTOLIC BLOOD PRESSURE: 118 MMHG | WEIGHT: 209.2 LBS | RESPIRATION RATE: 18 BRPM | TEMPERATURE: 97 F | HEIGHT: 73 IN | DIASTOLIC BLOOD PRESSURE: 54 MMHG | OXYGEN SATURATION: 96 % | BODY MASS INDEX: 27.73 KG/M2 | HEART RATE: 60 BPM

## 2020-07-15 DIAGNOSIS — Z01.818 PREOP GENERAL PHYSICAL EXAM: Primary | ICD-10-CM

## 2020-07-15 DIAGNOSIS — C61 MALIGNANT NEOPLASM OF PROSTATE (H): ICD-10-CM

## 2020-07-15 DIAGNOSIS — E78.5 HYPERLIPIDEMIA LDL GOAL <130: ICD-10-CM

## 2020-07-15 DIAGNOSIS — H25.011 CORTICAL AGE-RELATED CATARACT OF RIGHT EYE: ICD-10-CM

## 2020-07-15 PROCEDURE — 99214 OFFICE O/P EST MOD 30 MIN: CPT | Performed by: INTERNAL MEDICINE

## 2020-07-15 ASSESSMENT — PAIN SCALES - GENERAL: PAINLEVEL: NO PAIN (0)

## 2020-07-15 ASSESSMENT — MIFFLIN-ST. JEOR: SCORE: 1777.8
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
## 2020-08-04 DIAGNOSIS — E78.5 HYPERLIPIDEMIA LDL GOAL <130: ICD-10-CM

## 2020-08-05 RX ORDER — ATORVASTATIN CALCIUM 20 MG/1
TABLET, FILM COATED ORAL
Qty: 30 TABLET | Refills: 0 | Status: SHIPPED | OUTPATIENT
Start: 2020-08-05 | End: 2020-08-26

## 2020-08-05 NOTE — TELEPHONE ENCOUNTER
"Routing refill request to provider for review/approval because:  Labs not current:  LDL  T'd up 1 month for provider review.      Requested Prescriptions   Pending Prescriptions Disp Refills     atorvastatin (LIPITOR) 20 MG tablet 90 tablet 3     Sig: TAKE 1 TABLET EVERY DAY   Last Written Prescription Date:  6/11/2020  Last Fill Quantity: 90,  # refills: 3   Last office visit: 7/15/2020 with prescribing provider:     Future Office Visit:        Statins Protocol Failed - 8/4/2020 11:44 AM        Failed - LDL on file in past 12 months     Recent Labs   Lab Test 03/27/18  1310   LDL 93           Passed - No abnormal creatine kinase in past 12 months     No lab results found.             Passed - Recent (12 mo) or future (30 days) visit within the authorizing provider's specialty     Patient has had an office visit with the authorizing provider or a provider within the authorizing providers department within the previous 12 mos or has a future within next 30 days. See \"Patient Info\" tab in inbasket, or \"Choose Columns\" in Meds & Orders section of the refill encounter.              Passed - Medication is active on med list        Passed - Patient is age 18 or older         Amy Forman RN      "

## 2020-08-25 DIAGNOSIS — E78.5 HYPERLIPIDEMIA LDL GOAL <130: ICD-10-CM

## 2020-08-26 RX ORDER — ATORVASTATIN CALCIUM 20 MG/1
TABLET, FILM COATED ORAL
Qty: 30 TABLET | Refills: 0 | Status: SHIPPED | OUTPATIENT
Start: 2020-08-26 | End: 2020-10-21

## 2020-08-26 NOTE — TELEPHONE ENCOUNTER
"Routing refill request to provider for review/approval because:  Petra given x1 and patient did not follow up, please advise  Labs not current:  FLP    Lipitor  Last Written Prescription Date:  8/5/20  Last Fill Quantity: 30,  # refills: 0   Last office visit: 7/15/2020 with prescribing provider:  Sang   Future Office Visit:      Requested Prescriptions   Pending Prescriptions Disp Refills     atorvastatin (LIPITOR) 20 MG tablet [Pharmacy Med Name: ATORVASTATIN CALCIUM 20 MG Tablet] 30 tablet 0     Sig: TAKE 1 TABLET EVERY DAY       Statins Protocol Failed - 8/25/2020  8:40 PM        Failed - LDL on file in past 12 months     Recent Labs   Lab Test 03/27/18  1310   LDL 93             Failed - Recent (12 mo) or future (30 days) visit within the authorizing provider's specialty     Patient has had an office visit with the authorizing provider or a provider within the authorizing providers department within the previous 12 mos or has a future within next 30 days. See \"Patient Info\" tab in inbasket, or \"Choose Columns\" in Meds & Orders section of the refill encounter.              Passed - No abnormal creatine kinase in past 12 months     No lab results found.             Passed - Medication is active on med list        Passed - Patient is age 18 or older           Iris Tillman RN on 8/26/2020 at 12:11 PM    "

## 2020-08-31 DIAGNOSIS — E78.5 HYPERLIPIDEMIA LDL GOAL <130: ICD-10-CM

## 2020-08-31 DIAGNOSIS — C61 MALIGNANT NEOPLASM OF PROSTATE (H): ICD-10-CM

## 2020-08-31 LAB
CHOLEST SERPL-MCNC: 171 MG/DL
HDLC SERPL-MCNC: 51 MG/DL
LDLC SERPL CALC-MCNC: 82 MG/DL
NONHDLC SERPL-MCNC: 120 MG/DL
PSA SERPL-MCNC: <0.01 UG/L (ref 0–4)
TRIGL SERPL-MCNC: 190 MG/DL

## 2020-08-31 PROCEDURE — 36415 COLL VENOUS BLD VENIPUNCTURE: CPT | Performed by: INTERNAL MEDICINE

## 2020-08-31 PROCEDURE — 84153 ASSAY OF PSA TOTAL: CPT | Performed by: INTERNAL MEDICINE

## 2020-08-31 PROCEDURE — 80061 LIPID PANEL: CPT | Performed by: INTERNAL MEDICINE

## 2020-09-24 ENCOUNTER — NURSE TRIAGE (OUTPATIENT)
Dept: INTERNAL MEDICINE | Facility: CLINIC | Age: 65
End: 2020-09-24

## 2020-09-24 ENCOUNTER — OFFICE VISIT (OUTPATIENT)
Dept: FAMILY MEDICINE | Facility: OTHER | Age: 65
End: 2020-09-24
Payer: COMMERCIAL

## 2020-09-24 VITALS
SYSTOLIC BLOOD PRESSURE: 130 MMHG | BODY MASS INDEX: 26.45 KG/M2 | RESPIRATION RATE: 16 BRPM | WEIGHT: 200.5 LBS | TEMPERATURE: 98.1 F | HEART RATE: 74 BPM | DIASTOLIC BLOOD PRESSURE: 74 MMHG

## 2020-09-24 DIAGNOSIS — N18.30 CKD (CHRONIC KIDNEY DISEASE) STAGE 3, GFR 30-59 ML/MIN (H): ICD-10-CM

## 2020-09-24 DIAGNOSIS — T24.231A PARTIAL THICKNESS BURN OF RIGHT LOWER LEG, INITIAL ENCOUNTER: Primary | ICD-10-CM

## 2020-09-24 PROCEDURE — 99213 OFFICE O/P EST LOW 20 MIN: CPT | Performed by: PHYSICIAN ASSISTANT
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
## 2020-10-21 DIAGNOSIS — E78.5 HYPERLIPIDEMIA LDL GOAL <130: ICD-10-CM

## 2020-10-21 RX ORDER — ATORVASTATIN CALCIUM 20 MG/1
TABLET, FILM COATED ORAL
Qty: 90 TABLET | Refills: 2 | Status: SHIPPED | OUTPATIENT
Start: 2020-10-21 | End: 2021-02-16

## 2020-10-21 NOTE — TELEPHONE ENCOUNTER
"  Requested Prescriptions   Pending Prescriptions Disp Refills     atorvastatin (LIPITOR) 20 MG tablet 90 tablet 2   7/15/2020    Statins Protocol Passed - 10/21/2020 10:36 AM        Passed - LDL on file in past 12 months     Recent Labs   Lab Test 08/31/20  0833   LDL 82             Passed - No abnormal creatine kinase in past 12 months     No lab results found.             Passed - Recent (12 mo) or future (30 days) visit within the authorizing provider's specialty     Patient has had an office visit with the authorizing provider or a provider within the authorizing providers department within the previous 12 mos or has a future within next 30 days. See \"Patient Info\" tab in inbasket, or \"Choose Columns\" in Meds & Orders section of the refill encounter.              Passed - Medication is active on med list        Passed - Patient is age 18 or older         Prescription approved per [facility] Refill Protocol.  Amy Forman RN      "

## 2020-11-06 LAB
ALT SERPL-CCNC: 31 U/L (ref 8–45)
AST SERPL-CCNC: 22 U/L (ref 5–41)
CREAT SERPL-MCNC: 1.11 MG/DL (ref 0.72–1.25)
GFR SERPL CREATININE-BSD FRML MDRD: >60 ML/MIN/1.73M2
GLUCOSE SERPL-MCNC: 94 MG/DL (ref 70–100)
POTASSIUM SERPL-SCNC: 4.5 MMOL/L (ref 3.5–5.1)

## 2020-11-11 ENCOUNTER — TRANSFERRED RECORDS (OUTPATIENT)
Dept: HEALTH INFORMATION MANAGEMENT | Facility: CLINIC | Age: 65
End: 2020-11-11

## 2021-01-09 ENCOUNTER — HEALTH MAINTENANCE LETTER (OUTPATIENT)
Age: 66
End: 2021-01-09

## 2021-02-16 DIAGNOSIS — E78.5 HYPERLIPIDEMIA LDL GOAL <130: ICD-10-CM

## 2021-02-16 RX ORDER — ATORVASTATIN CALCIUM 20 MG/1
TABLET, FILM COATED ORAL
Qty: 90 TABLET | Refills: 1 | Status: SHIPPED | OUTPATIENT
Start: 2021-02-16 | End: 2021-08-17

## 2021-02-16 NOTE — TELEPHONE ENCOUNTER
Prescription approved per [facility] Refill Protocol.  Patient switching pharmacies.     Izzy Gray Rn

## 2021-05-08 ENCOUNTER — HEALTH MAINTENANCE LETTER (OUTPATIENT)
Age: 66
End: 2021-05-08

## 2021-06-11 ENCOUNTER — TRANSFERRED RECORDS (OUTPATIENT)
Dept: HEALTH INFORMATION MANAGEMENT | Facility: CLINIC | Age: 66
End: 2021-06-11

## 2021-08-16 DIAGNOSIS — E78.5 HYPERLIPIDEMIA LDL GOAL <130: ICD-10-CM

## 2021-08-17 RX ORDER — ATORVASTATIN CALCIUM 20 MG/1
TABLET, FILM COATED ORAL
Qty: 90 TABLET | Refills: 0 | Status: SHIPPED | OUTPATIENT
Start: 2021-08-17 | End: 2021-09-28

## 2021-08-17 NOTE — TELEPHONE ENCOUNTER
"Routing refill request to provider for review/approval because:  Patient needs to be seen because it has been more than 1 year since last office visit.  T'dup for 3 months per provider request for review    Sending to scheduling for yearly office visit due    Requested Prescriptions   Pending Prescriptions Disp Refills     atorvastatin (LIPITOR) 20 MG tablet [Pharmacy Med Name: ATORVASTATIN TABS 20MG] 90 tablet 3     Sig: TAKE 1 TABLET DAILY   Last Written Prescription Date:  2/16/2021  Last Fill Quantity: 90,  # refills: 1   Last office visit: 7/15/2020 with prescribing provider:     Future Office Visit:        Statins Protocol Failed - 8/16/2021 12:38 AM        Failed - Recent (12 mo) or future (30 days) visit within the authorizing provider's specialty     Patient has had an office visit with the authorizing provider or a provider within the authorizing providers department within the previous 12 mos or has a future within next 30 days. See \"Patient Info\" tab in inbasket, or \"Choose Columns\" in Meds & Orders section of the refill encounter.              Passed - LDL on file in past 12 months     Recent Labs   Lab Test 08/31/20  0833   LDL 82             Passed - No abnormal creatine kinase in past 12 months     No lab results found.             Passed - Medication is active on med list        Passed - Patient is age 18 or older         Amy Forman RN    "

## 2021-09-28 ENCOUNTER — OFFICE VISIT (OUTPATIENT)
Dept: INTERNAL MEDICINE | Facility: CLINIC | Age: 66
End: 2021-09-28
Payer: COMMERCIAL

## 2021-09-28 VITALS
WEIGHT: 192 LBS | HEART RATE: 78 BPM | OXYGEN SATURATION: 98 % | SYSTOLIC BLOOD PRESSURE: 124 MMHG | DIASTOLIC BLOOD PRESSURE: 76 MMHG | BODY MASS INDEX: 25.45 KG/M2 | HEIGHT: 73 IN | TEMPERATURE: 96.6 F | RESPIRATION RATE: 16 BRPM

## 2021-09-28 DIAGNOSIS — E78.5 HYPERLIPIDEMIA LDL GOAL <130: ICD-10-CM

## 2021-09-28 DIAGNOSIS — Z23 NEED FOR PROPHYLACTIC VACCINATION AND INOCULATION AGAINST INFLUENZA: ICD-10-CM

## 2021-09-28 DIAGNOSIS — Z00.00 MEDICARE ANNUAL WELLNESS VISIT, SUBSEQUENT: Primary | ICD-10-CM

## 2021-09-28 DIAGNOSIS — C83.390 PRIMARY CNS LYMPHOMA: ICD-10-CM

## 2021-09-28 DIAGNOSIS — C61 MALIGNANT NEOPLASM OF PROSTATE (H): ICD-10-CM

## 2021-09-28 PROBLEM — N18.30 CKD (CHRONIC KIDNEY DISEASE) STAGE 3, GFR 30-59 ML/MIN (H): Status: RESOLVED | Noted: 2020-09-24 | Resolved: 2021-09-28

## 2021-09-28 LAB
CHOLEST SERPL-MCNC: 167 MG/DL
FASTING STATUS PATIENT QL REPORTED: YES
HDLC SERPL-MCNC: 55 MG/DL
LDLC SERPL CALC-MCNC: 70 MG/DL
NONHDLC SERPL-MCNC: 112 MG/DL
PSA SERPL-MCNC: <0.01 UG/L (ref 0–4)
TRIGL SERPL-MCNC: 211 MG/DL

## 2021-09-28 PROCEDURE — 84153 ASSAY OF PSA TOTAL: CPT | Performed by: INTERNAL MEDICINE

## 2021-09-28 PROCEDURE — 99397 PER PM REEVAL EST PAT 65+ YR: CPT | Mod: 25 | Performed by: INTERNAL MEDICINE

## 2021-09-28 PROCEDURE — 80061 LIPID PANEL: CPT | Performed by: INTERNAL MEDICINE

## 2021-09-28 PROCEDURE — G0008 ADMIN INFLUENZA VIRUS VAC: HCPCS | Performed by: INTERNAL MEDICINE

## 2021-09-28 PROCEDURE — G0009 ADMIN PNEUMOCOCCAL VACCINE: HCPCS | Performed by: INTERNAL MEDICINE

## 2021-09-28 PROCEDURE — 90662 IIV NO PRSV INCREASED AG IM: CPT | Performed by: INTERNAL MEDICINE

## 2021-09-28 PROCEDURE — 36415 COLL VENOUS BLD VENIPUNCTURE: CPT | Performed by: INTERNAL MEDICINE

## 2021-09-28 PROCEDURE — 90732 PPSV23 VACC 2 YRS+ SUBQ/IM: CPT | Performed by: INTERNAL MEDICINE

## 2021-09-28 RX ORDER — ATORVASTATIN CALCIUM 20 MG/1
TABLET, FILM COATED ORAL
Qty: 90 TABLET | Refills: 3 | Status: SHIPPED | OUTPATIENT
Start: 2021-09-28 | End: 2022-10-21

## 2021-09-28 ASSESSMENT — PAIN SCALES - GENERAL: PAINLEVEL: NO PAIN (0)

## 2021-09-28 ASSESSMENT — ACTIVITIES OF DAILY LIVING (ADL): CURRENT_FUNCTION: NO ASSISTANCE NEEDED

## 2021-09-28 ASSESSMENT — MIFFLIN-ST. JEOR: SCORE: 1694.79

## 2021-09-28 NOTE — PROGRESS NOTES
Prior to immunization administration, verified patients identity using patient s name and date of birth. Please see Immunization Activity for additional information.     Screening Questionnaire for Adult Immunization    Are you sick today?   No   Do you have allergies to medications, food, a vaccine component or latex?   Yes   Have you ever had a serious reaction after receiving a vaccination?   No   Do you have a long-term health problem with heart, lung, kidney, or metabolic disease (e.g., diabetes), asthma, a blood disorder, no spleen, complement component deficiency, a cochlear implant, or a spinal fluid leak?  Are you on long-term aspirin therapy?   No   Do you have cancer, leukemia, HIV/AIDS, or any other immune system problem?   No   Do you have a parent, brother, or sister with an immune system problem?   No   In the past 3 months, have you taken medications that affect  your immune system, such as prednisone, other steroids, or anticancer drugs; drugs for the treatment of rheumatoid arthritis, Crohn s disease, or psoriasis; or have you had radiation treatments?   No   Have you had a seizure, or a brain or other nervous system problem?   No   During the past year, have you received a transfusion of blood or blood    products, or been given immune (gamma) globulin or antiviral drug?   No   For women: Are you pregnant or is there a chance you could become       pregnant during the next month?   No   Have you received any vaccinations in the past 4 weeks?   No     Immunization questionnaire was positive for at least one answer.  Notified Yes.        Per orders of Dr. Javon Domínguez, injection of HD Influenza and Pneumo 23 given by Shyann Ness CMA. Patient instructed to remain in clinic for 15 minutes afterwards, and to report any adverse reaction to me immediately.       Screening performed by Shyann Ness CMA on 9/28/2021 at 8:21 AM.

## 2021-09-28 NOTE — PROGRESS NOTES
"SUBJECTIVE:   Jaswinder Thompson Sr. is a 66 year old male who presents for Preventive Visit.    Patient has been advised of split billing requirements and indicates understanding: Yes   Are you in the first 12 months of your Medicare coverage?  No    Healthy Habits:     In general, how would you rate your overall health?  Excellent    Frequency of exercise:  None (Active around the house)    Duration of exercise:  Less than 15 minutes    Do you usually eat at least 4 servings of fruit and vegetables a day, include whole grains    & fiber and avoid regularly eating high fat or \"junk\" foods?  No    Taking medications regularly:  Yes    Barriers to taking medications:  None    Medication side effects:  None    Ability to successfully perform activities of daily living:  No assistance needed    Home Safety:  No safety concerns identified    Hearing Impairment:  Feel that people are mumbling or not speaking clearly, need to ask people to speak up or repeat themselves and difficulty following a conversation in a noisy restaurant or crowded room    In the past 6 months, have you been bothered by leaking of urine?  No    In general, how would you rate your overall mental or emotional health?  Very good      PHQ-2 Total Score: 0    Additional concerns today:  No      Lymphoma is followed by [facility] and doing well, sees them every 6 months. Remission and feeling good.      Active out side, fishing yardwork.  Not regular exercise, but walk with dogs.      Weight is down 20 pounds over the last year, goal of 185    Colonoscopy in 2016 but no polyps on biopsy, ok for 10 years.    Do you feel safe in your environment? Yes    Have you ever done Advance Care Planning? (For example, a Health Directive, POLST, or a discussion with a medical provider or your loved ones about your wishes): Yes, patient states has an Advance Care Planning document and will bring a copy to the clinic.       Fall risk  Fallen 2 or more times in the past " year?: No  Any fall with injury in the past year?: No    Cognitive Screening   1) Repeat 3 items (Leader, Season, Table)    2) Clock draw: NORMAL  3) 3 item recall: Recalls 3 objects  Results: 3 items recalled: COGNITIVE IMPAIRMENT LESS LIKELY    Mini-CogTM Copyright MELINDA Redmond. Licensed by the author for use in [facility]; reprinted with permission (amber@[facility]). All rights reserved.      Do you have sleep apnea, excessive snoring or daytime drowsiness?: no    Reviewed and updated as needed this visit by clinical staff  Tobacco  Allergies  Meds              Reviewed and updated as needed this visit by Provider                Social History     Tobacco Use     Smoking status: Former Smoker     Quit date: 2005     Years since quittin.2     Smokeless tobacco: Never Used   Substance Use Topics     Alcohol use: Yes     Alcohol/week: 0.0 standard drinks     If you drink alcohol do you typically have >3 drinks per day or >7 drinks per week? No    Alcohol Use 2017   Prescreen: >3 drinks/day or >7 drinks/week? The patient does not drink >3 drinks per day nor >7 drinks per week.       Current providers sharing in care for this patient include:   Patient Care Team:  Javon Domínguez MD as PCP - General (Internal Medicine)  Javon Domínguez MD as Assigned PCP    The following health maintenance items are reviewed in Epic and correct as of today:  Health Maintenance Due   Topic Date Due     ANNUAL REVIEW OF  ORDERS  Never done     HEPATITIS C SCREENING  Never done     ZOSTER IMMUNIZATION (2 of 3) 2013     ADVANCE CARE PLANNING  2017     Pneumococcal Vaccine: 65+ Years (2 of 4 - PPSV23) 2018     MEDICARE ANNUAL WELLNESS VISIT  2019     DTAP/TDAP/TD IMMUNIZATION (2 - Td or Tdap) 2019     COLORECTAL CANCER SCREENING  2021     FALL RISK ASSESSMENT  07/15/2021     LIPID  2021     INFLUENZA VACCINE (1) 2021     Lab work is in process  Labs reviewed in  "EPIC  Pneumonia Vaccine:needs pneumo 23     Review of Systems  Constitutional, HEENT, cardiovascular, pulmonary, gi and gu systems are negative, except as otherwise noted.    OBJECTIVE:   /76   Pulse 78   Temp (!) 96.6  F (35.9  C) (Temporal)   Resp 16   Ht 1.854 m (6' 1\")   Wt 87.1 kg (192 lb)   SpO2 98%   BMI 25.33 kg/m   Estimated body mass index is 25.33 kg/m  as calculated from the following:    Height as of this encounter: 1.854 m (6' 1\").    Weight as of this encounter: 87.1 kg (192 lb).  Physical Exam  GENERAL: healthy, alert and no distress  EYES: Eyes grossly normal to inspection, PERRL and conjunctivae and sclerae normal  HENT: normal cephalic/atraumatic, ear canals and TM's normal, nose and mouth without ulcers or lesions, oropharynx clear, oral mucous membranes moist and right ear with dark brown wax, removed with curette.  NECK: no adenopathy, no asymmetry, masses, or scars and thyroid normal to palpation  RESP: lungs clear to auscultation - no rales, rhonchi or wheezes  CV: regular rate and rhythm, normal S1 S2, no S3 or S4, no murmur, click or rub, no peripheral edema and peripheral pulses strong  ABDOMEN: soft, nontender, no hepatosplenomegaly, no masses and bowel sounds normal  MS: no gross musculoskeletal defects noted, no edema  SKIN: no suspicious lesions or rashes  NEURO: Normal strength and tone, mentation intact and speech normal  PSYCH: mentation appears normal, affect normal/bright    ASSESSMENT / PLAN:       ICD-10-CM    1. Medicare annual wellness visit, subsequent  Z00.00    2. Hyperlipidemia LDL goal <130  E78.5 Lipid panel reflex to direct LDL Fasting     atorvastatin (LIPITOR) 20 MG tablet   3. Malignant neoplasm of prostate (H)  C61 PSA, tumor marker   4. 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  Hyperlipidemia we will check his fasting lipid panel continue his atorvastatin 20 mg a day    History of prostate cancer in the past we will check a PSA today which has been undetectable in the past  Blood pressures controlled  Colonoscopy was in 216 and had normal mucosa so should be good for 10 years  We will get him a flu shot and second pneumonia shot today.      Patient has been advised of split billing requirements and indicates understanding: Yes  COUNSELING:  Reviewed preventive health counseling, as reflected in patient instructions       Regular exercise       Healthy diet/nutrition    Estimated body mass index is 25.33 kg/m  as calculated from the following:    Height as of this encounter: 1.854 m (6' 1\").    Weight as of this encounter: 87.1 kg (192 lb).        He reports that he quit smoking about 16 years ago. He has never used smokeless tobacco.      Appropriate preventive services were discussed with this patient, including applicable screening as appropriate for cardiovascular disease, diabetes, osteopenia/osteoporosis, and glaucoma.  As appropriate for age/gender, discussed screening for colorectal cancer, prostate cancer, breast cancer, and cervical cancer. Checklist reviewing preventive services available has been given to the patient.    Reviewed patients plan of care and provided an AVS. The Basic Care Plan (routine screening as documented in Health Maintenance) for Jaswinder meets the Care Plan requirement. This Care Plan has been established and reviewed with the Patient.    Counseling Resources:  ATP IV Guidelines  Pooled Cohorts Equation Calculator  Breast Cancer Risk Calculator  Breast Cancer: Medication to Reduce Risk  FRAX Risk Assessment  ICSI Preventive Guidelines  Dietary Guidelines for Americans, 2010  USDA's MyPlate  ASA Prophylaxis  Lung CA Screening    Javon Domínguez MD  [facility]    Identified Health Risks:  "

## 2021-10-15 ENCOUNTER — MYC MEDICAL ADVICE (OUTPATIENT)
Dept: INTERNAL MEDICINE | Facility: CLINIC | Age: 66
End: 2021-10-15

## 2021-12-20 ENCOUNTER — TRANSFERRED RECORDS (OUTPATIENT)
Dept: HEALTH INFORMATION MANAGEMENT | Facility: CLINIC | Age: 66
End: 2021-12-20
Payer: COMMERCIAL

## 2022-10-09 ENCOUNTER — HEALTH MAINTENANCE LETTER (OUTPATIENT)
Age: 67
End: 2022-10-09

## 2022-10-17 DIAGNOSIS — E78.5 HYPERLIPIDEMIA LDL GOAL <130: ICD-10-CM

## 2022-10-21 RX ORDER — ATORVASTATIN CALCIUM 20 MG/1
TABLET, FILM COATED ORAL
Qty: 90 TABLET | Refills: 3 | Status: SHIPPED | OUTPATIENT
Start: 2022-10-21

## 2022-10-21 NOTE — TELEPHONE ENCOUNTER
Routing refill request to provider for review/approval because:  Labs not current:  LDL  Patient needs to be seen because it has been more than 1 year since last office visit.      Kinjal Ibarra, RN, BSN

## 2022-11-26 ENCOUNTER — HEALTH MAINTENANCE LETTER (OUTPATIENT)
Age: 67
End: 2022-11-26

## 2024-01-06 ENCOUNTER — HEALTH MAINTENANCE LETTER (OUTPATIENT)
Age: 69
End: 2024-01-06

## 2025-01-08 ENCOUNTER — OFFICE VISIT (OUTPATIENT)
Dept: FAMILY MEDICINE | Facility: CLINIC | Age: 70
End: 2025-01-08
Payer: COMMERCIAL

## 2025-01-08 VITALS
RESPIRATION RATE: 16 BRPM | DIASTOLIC BLOOD PRESSURE: 76 MMHG | WEIGHT: 183.4 LBS | SYSTOLIC BLOOD PRESSURE: 124 MMHG | HEART RATE: 77 BPM | BODY MASS INDEX: 24.2 KG/M2 | OXYGEN SATURATION: 99 %

## 2025-01-08 DIAGNOSIS — E78.5 HYPERLIPIDEMIA LDL GOAL <130: Primary | ICD-10-CM

## 2025-01-08 DIAGNOSIS — Z13.220 LIPID SCREENING: ICD-10-CM

## 2025-01-08 DIAGNOSIS — J91.0 MALIGNANT PLEURAL EFFUSION (H): ICD-10-CM

## 2025-01-08 DIAGNOSIS — Z87.01 HISTORY OF ASPIRATION PNEUMONIA: ICD-10-CM

## 2025-01-08 PROBLEM — Z87.2 HISTORY OF CELLULITIS: Status: ACTIVE | Noted: 2022-12-28

## 2025-01-08 PROBLEM — J96.01 ACUTE RESPIRATORY FAILURE WITH HYPOXIA (H): Status: ACTIVE | Noted: 2025-01-08

## 2025-01-08 PROBLEM — I31.1 CONSTRICTIVE PERICARDITIS: Status: ACTIVE | Noted: 2022-12-08

## 2025-01-08 PROBLEM — E88.3 TUMOR LYSIS SYNDROME: Status: ACTIVE | Noted: 2022-12-09

## 2025-01-08 PROBLEM — Z86.711 PERSONAL HISTORY OF PE (PULMONARY EMBOLISM): Status: ACTIVE | Noted: 2019-05-13

## 2025-01-08 PROBLEM — G40.109: Status: ACTIVE | Noted: 2025-01-08

## 2025-01-08 PROBLEM — I48.91 ATRIAL FIBRILLATION, UNSPECIFIED TYPE (H): Status: ACTIVE | Noted: 2025-01-08

## 2025-01-08 PROBLEM — C83.390 PRIMARY CNS LYMPHOMA: Status: ACTIVE | Noted: 2019-06-04

## 2025-01-08 PROBLEM — Z85.72 HISTORY OF B-CELL LYMPHOMA: Status: ACTIVE | Noted: 2022-08-02

## 2025-01-08 PROBLEM — Z86.79 HISTORY OF PERICARDITIS: Status: ACTIVE | Noted: 2022-12-08

## 2025-01-08 PROBLEM — K56.609 SMALL BOWEL OBSTRUCTION (H): Status: ACTIVE | Noted: 2019-04-29

## 2025-01-08 LAB
ALT SERPL W P-5'-P-CCNC: 44 U/L (ref 0–70)
CHOLEST SERPL-MCNC: 181 MG/DL
FASTING STATUS PATIENT QL REPORTED: YES
HDLC SERPL-MCNC: 79 MG/DL
LDLC SERPL CALC-MCNC: 73 MG/DL
NONHDLC SERPL-MCNC: 102 MG/DL
TRIGL SERPL-MCNC: 146 MG/DL

## 2025-01-08 PROCEDURE — 99204 OFFICE O/P NEW MOD 45 MIN: CPT | Performed by: STUDENT IN AN ORGANIZED HEALTH CARE EDUCATION/TRAINING PROGRAM

## 2025-01-08 PROCEDURE — 80061 LIPID PANEL: CPT | Mod: GZ | Performed by: STUDENT IN AN ORGANIZED HEALTH CARE EDUCATION/TRAINING PROGRAM

## 2025-01-08 PROCEDURE — 36415 COLL VENOUS BLD VENIPUNCTURE: CPT | Performed by: STUDENT IN AN ORGANIZED HEALTH CARE EDUCATION/TRAINING PROGRAM

## 2025-01-08 PROCEDURE — 84460 ALANINE AMINO (ALT) (SGPT): CPT | Performed by: STUDENT IN AN ORGANIZED HEALTH CARE EDUCATION/TRAINING PROGRAM

## 2025-01-08 PROCEDURE — G2211 COMPLEX E/M VISIT ADD ON: HCPCS | Performed by: STUDENT IN AN ORGANIZED HEALTH CARE EDUCATION/TRAINING PROGRAM

## 2025-01-08 RX ORDER — ATORVASTATIN CALCIUM 20 MG/1
TABLET, FILM COATED ORAL
Qty: 120 TABLET | Refills: 0 | Status: SHIPPED | OUTPATIENT
Start: 2025-01-08

## 2025-01-08 ASSESSMENT — PAIN SCALES - GENERAL: PAINLEVEL_OUTOF10: NO PAIN (0)

## 2025-01-08 NOTE — PROGRESS NOTES
Assessment & Plan     Hyperlipidemia LDL goal <130  Lipid screening  Labs wnl and stable. Refills sent through follow-up with PCP.   - Lipid panel reflex to direct LDL Fasting; Future  - ALT; Future  - Lipid panel reflex to direct LDL Fasting  - ALT  - atorvastatin (LIPITOR) 20 MG tablet; TAKE 1 TABLET DAILY    Malignant pleural effusion (H)  History of aspiration pneumonia  [facility] will [facility] prior PCP Dr. Domínguez. Updated chart for history since last in clinic visit. Appears now in remission for noted BCL. Also reporting many prior diagnoses resolved including: PE, pericarditis, cellulitis, Afib, TLS, epilepsy, ahrf. Will hold on resolving all to permit review with PCP at scheduled AWV with him in April.     45 minutes spent by me on the date of the encounter doing chart review, history and exam, documentation and further activities per the note    Subjective   Jaswinder is a 69 year old, presenting for the following health issues:  Establish Care        1/8/2025     7:55 AM   Additional Questions   Roomed by Donnie AGOSTO     Via the [email or web address] Maintenance questionnaire, the patient has reported the following services have been completed -Colonscopy: [facility] 2022-05-11, this information has been sent to the abstraction team.  History of Present Illness       Reason for visit:  I am new   He is taking medications regularly.   Establish care, and refills    Went in for a physical to get established with a new doctor. Coughed a little and they checked and he had pneumonia. The CXR and they noticed something external to the lung and dx lymphoma.   Current status with Lymphoma, last 2 years has been doing q6 month CT scans and PET scans. Last one was told there were signs of residual. Removed port. No more treatment planned. Sounds like maybe annual follow-up for a while, but unclear.     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 - see neuro report 9/06    Tubular adenoma of colon        PAST SURGICAL HISTORY:   Past Surgical History:   Procedure Laterality Date    ARTHROSCOPY SHOULDER DECOMPRESSION Left 11/18/2015    Procedure: ARTHROSCOPY SHOULDER DECOMPRESSION;  Surgeon: Arturo Martinez MD;  Location: [facility] OR    ARTHROSCOPY SHOULDER ROTATOR CUFF REPAIR Left 11/18/2015    Procedure: ARTHROSCOPY SHOULDER ROTATOR CUFF REPAIR;  Surgeon: Arturo Martinez MD;  Location: [facility] OR    COLONOSCOPY  3/28/2011    COMBINED COLONOSCOPY, SINGLE BIOPSY/POLYPECTOMY BY BIOPSY performed by DONAVON GOTTLIEB at  GI    COLONOSCOPY N/A 5/2/2016    Procedure: COMBINED COLONOSCOPY, SINGLE OR MULTIPLE BIOPSY/POLYPECTOMY BY BIOPSY;  Surgeon: Alec Mclain MD;  Location:  [facility]    HC EXCISION BREAST LESION, OPEN >=1  04/14/08    Right    HC REMOVE TONSILS/ADENOIDS,<11 Y/O      unsure of age    IR LUMBAR PUNCTURE  5/8/2019    IR PICC PLACEMENT > 5 YRS OF AGE  4/9/2020    IR PICC PLACEMENT > 5 YRS OF AGE  3/12/2020    ZZC REMV PROSTATE,RETROPUB,RADICAL  09/14/2005    Nerve sparing radical retropubic prostatectomy and pelvic lymph node dissection.  [facility].       FAMILY HISTORY:   Family History   Problem Relation Age of Onset    Hypertension Father     Allergies Father         bees    Alzheimer Disease Father     Arthritis Father     Cerebrovascular Disease Mother     Eye Disorder Mother         cataract    Heart Disease Paternal Grandfather     Other Cancer Son         kidney cancer    Breast Cancer Sister     Heart Disease Sister     Cancer Brother     Cardiovascular Brother         Stent placed in his 60's    Cancer Sister         lung cancer       SOCIAL HISTORY:   Social History     Tobacco Use     Smoking status: Former     Current packs/day: 0.00     Types: Cigarettes     Quit date: 2005     Years since quittin.5    Smokeless tobacco: Never   Substance Use Topics    Alcohol use: Yes     Alcohol/week: 0.0 standard drinks of alcohol         Objective    /76   Pulse 77   Resp 16   Wt 83.2 kg (183 lb 6.4 oz)   SpO2 99%   BMI 24.20 kg/m    Body mass index is 24.2 kg/m .  Physical Exam   GENERAL: alert and no distress  EYES: Eyes grossly normal to inspection, PERRL and conjunctivae and sclerae normal  HENT: nose and mouth without ulcers or lesions  NECK: no adenopathy, no asymmetry, masses, or scars  RESP: lungs clear to auscultation - no rales, rhonchi or wheezes, normal rate and effort  CV: regular rate and rhythm, no peripheral edema  ABDOMEN: soft, nontender, bowel sounds normal  MS: no gross musculoskeletal defects noted, no edema  SKIN: no suspicious lesions or rashes  NEURO: Normal strength and tone, mentation intact and speech normal  PSYCH: mentation appears normal, affect normal/bright          Signed Electronically by: Porsha Garcia DO

## 2025-02-18 ENCOUNTER — TELEPHONE (OUTPATIENT)
Dept: FAMILY MEDICINE | Facility: CLINIC | Age: 70
End: 2025-02-18
Payer: COMMERCIAL

## 2025-02-18 NOTE — TELEPHONE ENCOUNTER
Patient Quality Outreach    Patient is due for the following:   Physical Annual Wellness Visit    Action(s) Taken:   Schedule a Annual Wellness Visit    Type of outreach:    Chart review performed, no outreach needed.    Questions for provider review:    None           Chasity Avelar MA

## 2025-04-22 ENCOUNTER — OFFICE VISIT (OUTPATIENT)
Dept: INTERNAL MEDICINE | Facility: CLINIC | Age: 70
End: 2025-04-22
Payer: COMMERCIAL

## 2025-04-22 VITALS
OXYGEN SATURATION: 100 % | HEART RATE: 76 BPM | WEIGHT: 182.5 LBS | DIASTOLIC BLOOD PRESSURE: 76 MMHG | SYSTOLIC BLOOD PRESSURE: 122 MMHG | BODY MASS INDEX: 24.72 KG/M2 | HEIGHT: 72 IN | TEMPERATURE: 97.6 F | RESPIRATION RATE: 18 BRPM

## 2025-04-22 DIAGNOSIS — Z85.72 HISTORY OF B-CELL LYMPHOMA: ICD-10-CM

## 2025-04-22 DIAGNOSIS — E78.5 HYPERLIPIDEMIA LDL GOAL <130: ICD-10-CM

## 2025-04-22 DIAGNOSIS — Z00.00 MEDICARE ANNUAL WELLNESS VISIT, SUBSEQUENT: Primary | ICD-10-CM

## 2025-04-22 PROBLEM — I48.91 ATRIAL FIBRILLATION, UNSPECIFIED TYPE (H): Status: RESOLVED | Noted: 2025-01-08 | Resolved: 2025-04-22

## 2025-04-22 PROBLEM — J96.01 ACUTE RESPIRATORY FAILURE WITH HYPOXIA (H): Status: RESOLVED | Noted: 2025-01-08 | Resolved: 2025-04-22

## 2025-04-22 PROBLEM — G40.109: Status: RESOLVED | Noted: 2025-01-08 | Resolved: 2025-04-22

## 2025-04-22 PROCEDURE — 1126F AMNT PAIN NOTED NONE PRSNT: CPT | Performed by: INTERNAL MEDICINE

## 2025-04-22 PROCEDURE — G0439 PPPS, SUBSEQ VISIT: HCPCS | Performed by: INTERNAL MEDICINE

## 2025-04-22 PROCEDURE — 3078F DIAST BP <80 MM HG: CPT | Performed by: INTERNAL MEDICINE

## 2025-04-22 PROCEDURE — 99203 OFFICE O/P NEW LOW 30 MIN: CPT | Mod: 25 | Performed by: INTERNAL MEDICINE

## 2025-04-22 PROCEDURE — G2211 COMPLEX E/M VISIT ADD ON: HCPCS | Performed by: INTERNAL MEDICINE

## 2025-04-22 PROCEDURE — 3074F SYST BP LT 130 MM HG: CPT | Performed by: INTERNAL MEDICINE

## 2025-04-22 RX ORDER — ATORVASTATIN CALCIUM 20 MG/1
TABLET, FILM COATED ORAL
Qty: 90 TABLET | Refills: 3 | Status: SHIPPED | OUTPATIENT
Start: 2025-04-22

## 2025-04-22 SDOH — HEALTH STABILITY: PHYSICAL HEALTH: ON AVERAGE, HOW MANY DAYS PER WEEK DO YOU ENGAGE IN MODERATE TO STRENUOUS EXERCISE (LIKE A BRISK WALK)?: 4 DAYS

## 2025-04-22 ASSESSMENT — SOCIAL DETERMINANTS OF HEALTH (SDOH): HOW OFTEN DO YOU GET TOGETHER WITH FRIENDS OR RELATIVES?: ONCE A WEEK

## 2025-04-22 ASSESSMENT — PAIN SCALES - GENERAL: PAINLEVEL_OUTOF10: NO PAIN (0)

## 2025-04-22 NOTE — PROGRESS NOTES
Preventive Care Visit  [facility]  Javon Domínguez MD, Internal Medicine  Apr 22, 2025      Assessment & Plan   Problem List Items Addressed This Visit       HYPERLIPIDEMIA LDL GOAL <130    Relevant Medications    atorvastatin (LIPITOR) 20 MG tablet    History of B-cell lymphoma     Other Visit Diagnoses       Medicare annual wellness visit, subsequent    -  Primary           Patient is a former patient of mine.  He moved up to [place] and went to [facility] for couple years.  He was diagnosed with a B-cell lymphoma after he had a left pleural effusion.  He was treated with chemotherapy and is now cancer free.  He moved back to [place] to be by his children.  He is doing well he is , lives in his house with his 2 dogs.  He is feeling very good.    His B-cell lymphoma is treated he follows with oncology once a year we reviewed his CT scan and labs from [place].    Hyperlipidemia he is on atorvastatin his labs were checked we will refill that for the next year    His immunizations are up-to-date  Colonoscopy is up-to-date  Overall doing well can follow-up in 1 year continue to exercise limit his alcohol and is overall doing well.         Counseling  Appropriate preventive services were addressed with this patient via screening, questionnaire, or discussion as appropriate for fall prevention, nutrition, physical activity, Tobacco-use cessation, social engagement, weight loss and cognition.  Checklist reviewing preventive services available has been given to the patient.  Reviewed patient's diet, addressing concerns and/or questions.     Follow-up   Return in about 1 year (around 4/22/2026) for Physical Exam.        Avani San is a 70 year old, presenting for the following:  Wellness Visit        4/22/2025     8:05 AM   Additional Questions   Roomed by Bria         HPI       Been living in [place] after wife passed away and was going to [facility].   Now moved back to  [place] closer to his kids. Living alone couple dogs. 3 sons live in the area.     Left pleural effusion 2022 and had hospitalization and thoracentesis. B cell Lymphoma different then his CNS lymphoma and was treated with chemotherapy, now in remission. Oncology follows him in [place] at [facility].    Feels great. Just checking in.     Shots are up to date,     Atorvastatin for cholesterol doing ok.  Now good high HDL.         Advance Care Planning    Discussed advance care planning with patient; however, patient declined at this time.        4/22/2025   General Health   How would you rate your overall physical health? Excellent   Feel stress (tense, anxious, or unable to sleep) Not at all         4/22/2025   Nutrition   Diet: Regular (no restrictions)    Breakfast skipped       Multiple values from one day are sorted in reverse-chronological order         4/22/2025   Exercise   Days per week of moderate/strenous exercise 4 days         4/22/2025   Social Factors   Frequency of gathering with friends or relatives Once a week   Worry food won't last until get money to buy more No   Food not last or not have enough money for food? No   Do you have housing? (Housing is defined as stable permanent housing and does not include staying ouside in a car, in a tent, in an abandoned building, in an overnight shelter, or couch-surfing.) Yes   Are you worried about losing your housing? No   Lack of transportation? No   Unable to get utilities (heat,electricity)? No         4/22/2025   Fall Risk   Fallen 2 or more times in the past year? No   Trouble with walking or balance? No          4/22/2025   Activities of Daily Living- Home Safety   Needs help with the following daily activites None of the above   Safety concerns in the home None of the above         4/22/2025   Dental   Dentist two times every year? Yes         4/22/2025   Hearing Screening   Hearing concerns? None of the above         4/22/2025   Driving Risk Screening    Patient/family members have concerns about driving No         2025   General Alertness/Fatigue Screening   Have you been more tired than usual lately? No         2025   Urinary Incontinence Screening   Bothered by leaking urine in past 6 months No       Today's PHQ-2 Score:       2025     7:49 AM   PHQ-2 (  [employer])   Q1: Little interest or pleasure in doing things 0   Q2: Feeling down, depressed or hopeless 0   PHQ-2 Score 0    Q1: Little interest or pleasure in doing things Not at all   Q2: Feeling down, depressed or hopeless Not at all   PHQ-2 Score 0       Patient-reported         2025   Substance Use   Alcohol more than 3/day or more than 7/wk No   Do you have a current opioid prescription? No   How severe/bad is pain from 1 to 10? 0/10 (No Pain)   Do you use any other substances recreationally? No     Social History     Tobacco Use    Smoking status: Former     Current packs/day: 0.00     Types: Cigarettes     Quit date: 2005     Years since quittin.8    Smokeless tobacco: Never   Substance Use Topics    Alcohol use: Yes     Alcohol/week: 0.0 standard drinks of alcohol    Drug use: No       ASCVD Risk   The 10-year ASCVD risk score (Adarsh DE LA ROSA, et al., 2019) is: 15.3%    Values used to calculate the score:      Age: 70 years      Sex: Male      Is Non- : No      Diabetic: No      Tobacco smoker: No      Systolic Blood Pressure: 122 mmHg      Is BP treated: No      HDL Cholesterol: 79 mg/dL      Total Cholesterol: 181 mg/dL  Reviewed and updated as needed this visit by Provider                    Labs reviewed in EPIC  Current providers sharing in care for this patient include:  Patient Care Team:  Javon Domínguez MD as PCP - General (Internal Medicine)  Porsha Garcia DO as Assigned PCP    The following health maintenance items are reviewed in Epic and correct as of today:  Health Maintenance   Topic Date Due    RSV VACCINE (1 - Risk 60-74 years  "1-dose series) Never done    DIABETES SCREENING  11/06/2023    MEDICARE ANNUAL WELLNESS VISIT  12/01/2024    COVID-19 Vaccine (8 - Mixed Product risk 2024-25 season) 05/04/2025    LIPID  01/08/2026    ANNUAL REVIEW OF HM ORDERS  01/08/2026    FALL RISK ASSESSMENT  04/22/2026    COLORECTAL CANCER SCREENING  05/11/2027    ADVANCE CARE PLANNING  06/17/2029    DTAP/TDAP/TD IMMUNIZATION (3 - Td or Tdap) 08/02/2032    HEPATITIS C SCREENING  Completed    PHQ-2 (once per calendar year)  Completed    INFLUENZA VACCINE  Completed    Pneumococcal Vaccine: 50+ Years  Completed    ZOSTER IMMUNIZATION  Completed    AORTIC ANEURYSM SCREENING (SYSTEM ASSIGNED)  Completed    HPV IMMUNIZATION  Aged Out    MENINGITIS IMMUNIZATION  Aged Out    LUNG CANCER SCREENING  Discontinued         Review of Systems  CONSTITUTIONAL: NEGATIVE for fever, chills, change in weight  INTEGUMENTARY/SKIN: NEGATIVE for worrisome rashes, moles or lesions  EYES: NEGATIVE for vision changes or irritation  ENT/MOUTH: NEGATIVE for ear, mouth and throat problems  RESP: NEGATIVE for significant cough or SOB  BREAST: NEGATIVE for masses, tenderness or discharge  CV: NEGATIVE for chest pain, palpitations or peripheral edema  GI: NEGATIVE for nausea, abdominal pain, heartburn, or change in bowel habits  : NEGATIVE for frequency, dysuria, or hematuria  MUSCULOSKELETAL: NEGATIVE for significant arthralgias or myalgia  NEURO: NEGATIVE for weakness, dizziness or paresthesias  ENDOCRINE: NEGATIVE for temperature intolerance, skin/hair changes  HEME: NEGATIVE for bleeding problems  PSYCHIATRIC: NEGATIVE for changes in mood or affect     Objective    Exam  /76   Pulse 76   Temp 97.6  F (36.4  C) (Temporal)   Resp 18   Ht 1.83 m (6' 0.05\")   Wt 82.8 kg (182 lb 8 oz)   SpO2 100%   BMI 24.72 kg/m     Estimated body mass index is 24.72 kg/m  as calculated from the following:    Height as of this encounter: 1.83 m (6' 0.05\").    Weight as of this encounter: 82.8 " kg (182 lb 8 oz).    Physical Exam  GENERAL: alert and no distress  EYES: Eyes grossly normal to inspection, PERRL and conjunctivae and sclerae normal  HENT: ear canals and TM's normal, nose and mouth without ulcers or lesions  NECK: no adenopathy, no asymmetry, masses, or scars  RESP: lungs clear to auscultation - no rales, rhonchi or wheezes  CV: regular rate and rhythm, normal S1 S2, no S3 or S4, no murmur, click or rub, no peripheral edema  ABDOMEN: soft, nontender, no hepatosplenomegaly, no masses and bowel sounds normal  MS: no gross musculoskeletal defects noted, no edema  SKIN: no suspicious lesions or rashes  NEURO: Normal strength and tone, mentation intact and speech normal  PSYCH: mentation appears normal, affect normal/bright        4/22/2025   Mini Cog   Clock Draw Score 2 Normal   3 Item Recall 2 objects recalled   Mini Cog Total Score 4              Signed Electronically by: aJvon Domínguez MD

## 2025-05-03 ENCOUNTER — HEALTH MAINTENANCE LETTER (OUTPATIENT)
Age: 70
End: 2025-05-03

## 2025-06-18 ENCOUNTER — OFFICE VISIT (OUTPATIENT)
Dept: INTERNAL MEDICINE | Facility: CLINIC | Age: 70
End: 2025-06-18
Payer: COMMERCIAL

## 2025-06-18 VITALS
BODY MASS INDEX: 24.73 KG/M2 | SYSTOLIC BLOOD PRESSURE: 125 MMHG | RESPIRATION RATE: 16 BRPM | HEART RATE: 91 BPM | TEMPERATURE: 97.9 F | OXYGEN SATURATION: 99 % | WEIGHT: 186.6 LBS | DIASTOLIC BLOOD PRESSURE: 72 MMHG | HEIGHT: 73 IN

## 2025-06-18 DIAGNOSIS — M19.012 ARTHRITIS OF LEFT SHOULDER: Primary | ICD-10-CM

## 2025-06-18 PROCEDURE — G2211 COMPLEX E/M VISIT ADD ON: HCPCS | Performed by: INTERNAL MEDICINE

## 2025-06-18 PROCEDURE — 1125F AMNT PAIN NOTED PAIN PRSNT: CPT | Performed by: INTERNAL MEDICINE

## 2025-06-18 PROCEDURE — 3078F DIAST BP <80 MM HG: CPT | Performed by: INTERNAL MEDICINE

## 2025-06-18 PROCEDURE — 99213 OFFICE O/P EST LOW 20 MIN: CPT | Performed by: INTERNAL MEDICINE

## 2025-06-18 PROCEDURE — 3074F SYST BP LT 130 MM HG: CPT | Performed by: INTERNAL MEDICINE

## 2025-06-18 ASSESSMENT — PAIN SCALES - GENERAL: PAINLEVEL_OUTOF10: SEVERE PAIN (7)
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
## 2025-06-18 NOTE — PATIENT INSTRUCTIONS
Ibuprofen 2 pills three times a day with food for a week  Ice after use for 10 minutes.     See physical therapy for exercises.     If not better then see sports medicine for injection.